# Patient Record
Sex: MALE | Race: WHITE | Employment: OTHER | URBAN - METROPOLITAN AREA
[De-identification: names, ages, dates, MRNs, and addresses within clinical notes are randomized per-mention and may not be internally consistent; named-entity substitution may affect disease eponyms.]

---

## 2018-06-18 ENCOUNTER — HOSPITAL ENCOUNTER (OUTPATIENT)
Dept: ULTRASOUND IMAGING | Age: 73
Discharge: HOME | End: 2018-06-20
Payer: MEDICARE

## 2018-06-18 DIAGNOSIS — N18.4 CHRONIC KIDNEY DISEASE, STAGE IV (SEVERE) (HCC): ICD-10-CM

## 2018-06-18 DIAGNOSIS — N13.30 HYDRONEPHROSIS, UNSPECIFIED HYDRONEPHROSIS TYPE: ICD-10-CM

## 2018-06-18 PROCEDURE — 76775 US EXAM ABDO BACK WALL LIM: CPT

## 2018-07-02 ENCOUNTER — HOSPITAL ENCOUNTER (OUTPATIENT)
Dept: CARDIAC CATH/INVASIVE PROCEDURES | Age: 73
Discharge: HOME | End: 2018-07-03
Attending: INTERNAL MEDICINE | Admitting: INTERNAL MEDICINE
Payer: MEDICARE

## 2018-07-02 DIAGNOSIS — N18.30 CKD (CHRONIC KIDNEY DISEASE), STAGE III (HCC): Primary | ICD-10-CM

## 2018-07-02 LAB
ABO/RH: NORMAL
ANION GAP SERPL CALCULATED.3IONS-SCNC: 14 MEQ/L (ref 7–13)
ANTIBODY SCREEN: NORMAL
BUN BLDV-MCNC: 50 MG/DL (ref 8–23)
CALCIUM SERPL-MCNC: 8.7 MG/DL (ref 8.6–10.2)
CHLORIDE BLD-SCNC: 107 MEQ/L (ref 98–107)
CO2: 21 MEQ/L (ref 22–29)
CREAT SERPL-MCNC: 2.53 MG/DL (ref 0.7–1.2)
GFR AFRICAN AMERICAN: 30.3
GFR NON-AFRICAN AMERICAN: 25
GLUCOSE BLD-MCNC: 133 MG/DL (ref 74–109)
GLUCOSE BLD-MCNC: 172 MG/DL (ref 60–115)
GLUCOSE BLD-MCNC: 313 MG/DL (ref 60–115)
HCT VFR BLD CALC: 41.2 % (ref 42–52)
HEMOGLOBIN: 13.7 G/DL (ref 14–18)
MCH RBC QN AUTO: 29.2 PG (ref 27–31.3)
MCHC RBC AUTO-ENTMCNC: 33.2 % (ref 33–37)
MCV RBC AUTO: 88.1 FL (ref 80–100)
PDW BLD-RTO: 13.6 % (ref 11.5–14.5)
PERFORMED ON: ABNORMAL
PLATELET # BLD: 218 K/UL (ref 130–400)
POC ACTIVATED CLOTTING TIME KAOLIN: 153 SEC (ref 82–152)
POC SAMPLE TYPE: ABNORMAL
POTASSIUM REFLEX MAGNESIUM: 5.1 MEQ/L (ref 3.5–5.1)
RBC # BLD: 4.67 M/UL (ref 4.7–6.1)
SODIUM BLD-SCNC: 142 MEQ/L (ref 132–144)
WBC # BLD: 10.9 K/UL (ref 4.8–10.8)

## 2018-07-02 PROCEDURE — 86901 BLOOD TYPING SEROLOGIC RH(D): CPT

## 2018-07-02 PROCEDURE — 37221 HC ILIAC TERRITORY PLASTY STENT: CPT | Performed by: INTERNAL MEDICINE

## 2018-07-02 PROCEDURE — C1887 CATHETER, GUIDING: HCPCS

## 2018-07-02 PROCEDURE — C1894 INTRO/SHEATH, NON-LASER: HCPCS

## 2018-07-02 PROCEDURE — 85347 COAGULATION TIME ACTIVATED: CPT

## 2018-07-02 PROCEDURE — 6360000002 HC RX W HCPCS

## 2018-07-02 PROCEDURE — 86850 RBC ANTIBODY SCREEN: CPT

## 2018-07-02 PROCEDURE — 75625 CONTRAST EXAM ABDOMINL AORTA: CPT | Performed by: INTERNAL MEDICINE

## 2018-07-02 PROCEDURE — 2580000003 HC RX 258: Performed by: INTERNAL MEDICINE

## 2018-07-02 PROCEDURE — 6360000002 HC RX W HCPCS: Performed by: INTERNAL MEDICINE

## 2018-07-02 PROCEDURE — 2580000003 HC RX 258

## 2018-07-02 PROCEDURE — 2500000003 HC RX 250 WO HCPCS

## 2018-07-02 PROCEDURE — C1876 STENT, NON-COA/NON-COV W/DEL: HCPCS

## 2018-07-02 PROCEDURE — C1769 GUIDE WIRE: HCPCS

## 2018-07-02 PROCEDURE — 85027 COMPLETE CBC AUTOMATED: CPT

## 2018-07-02 PROCEDURE — C1725 CATH, TRANSLUMIN NON-LASER: HCPCS

## 2018-07-02 PROCEDURE — 86900 BLOOD TYPING SEROLOGIC ABO: CPT

## 2018-07-02 PROCEDURE — 80048 BASIC METABOLIC PNL TOTAL CA: CPT

## 2018-07-02 PROCEDURE — 6370000000 HC RX 637 (ALT 250 FOR IP): Performed by: INTERNAL MEDICINE

## 2018-07-02 PROCEDURE — 75716 ARTERY X-RAYS ARMS/LEGS: CPT | Performed by: INTERNAL MEDICINE

## 2018-07-02 RX ORDER — HYDROXYZINE HYDROCHLORIDE 25 MG/1
25 TABLET, FILM COATED ORAL 2 TIMES DAILY
Status: DISCONTINUED | OUTPATIENT
Start: 2018-07-02 | End: 2018-07-03 | Stop reason: HOSPADM

## 2018-07-02 RX ORDER — NICOTINE 21 MG/24HR
1 PATCH, TRANSDERMAL 24 HOURS TRANSDERMAL DAILY
Status: DISCONTINUED | OUTPATIENT
Start: 2018-07-02 | End: 2018-07-03 | Stop reason: HOSPADM

## 2018-07-02 RX ORDER — NITROGLYCERIN 0.4 MG/1
0.4 TABLET SUBLINGUAL EVERY 5 MIN PRN
Status: DISCONTINUED | OUTPATIENT
Start: 2018-07-02 | End: 2018-07-03 | Stop reason: HOSPADM

## 2018-07-02 RX ORDER — LEVOTHYROXINE SODIUM 0.2 MG/1
200 TABLET ORAL DAILY
Status: DISCONTINUED | OUTPATIENT
Start: 2018-07-02 | End: 2018-07-03 | Stop reason: HOSPADM

## 2018-07-02 RX ORDER — DEXTROSE MONOHYDRATE 25 G/50ML
12.5 INJECTION, SOLUTION INTRAVENOUS PRN
Status: DISCONTINUED | OUTPATIENT
Start: 2018-07-02 | End: 2018-07-03 | Stop reason: HOSPADM

## 2018-07-02 RX ORDER — SODIUM CHLORIDE 0.9 % (FLUSH) 0.9 %
10 SYRINGE (ML) INJECTION EVERY 12 HOURS SCHEDULED
Status: DISCONTINUED | OUTPATIENT
Start: 2018-07-02 | End: 2018-07-03 | Stop reason: HOSPADM

## 2018-07-02 RX ORDER — ACETYLCYSTEINE 200 MG/ML
600 SOLUTION ORAL; RESPIRATORY (INHALATION) 2 TIMES DAILY
Status: DISCONTINUED | OUTPATIENT
Start: 2018-07-02 | End: 2018-07-03 | Stop reason: HOSPADM

## 2018-07-02 RX ORDER — SIMVASTATIN 20 MG
20 TABLET ORAL NIGHTLY
COMMUNITY

## 2018-07-02 RX ORDER — ACETYLCYSTEINE 200 MG/ML
600 SOLUTION ORAL; RESPIRATORY (INHALATION) 2 TIMES DAILY
Status: ON HOLD | COMMUNITY
End: 2018-07-03 | Stop reason: HOSPADM

## 2018-07-02 RX ORDER — ASPIRIN 81 MG/1
81 TABLET ORAL DAILY
Status: DISCONTINUED | OUTPATIENT
Start: 2018-07-02 | End: 2018-07-02 | Stop reason: SDUPTHER

## 2018-07-02 RX ORDER — DEXTROSE MONOHYDRATE 50 MG/ML
100 INJECTION, SOLUTION INTRAVENOUS PRN
Status: DISCONTINUED | OUTPATIENT
Start: 2018-07-02 | End: 2018-07-03 | Stop reason: HOSPADM

## 2018-07-02 RX ORDER — HYDRALAZINE HYDROCHLORIDE 20 MG/ML
10 INJECTION INTRAMUSCULAR; INTRAVENOUS EVERY 10 MIN PRN
Status: DISCONTINUED | OUTPATIENT
Start: 2018-07-02 | End: 2018-07-03 | Stop reason: HOSPADM

## 2018-07-02 RX ORDER — INSULIN GLARGINE 100 [IU]/ML
24 INJECTION, SOLUTION SUBCUTANEOUS NIGHTLY
Status: DISCONTINUED | OUTPATIENT
Start: 2018-07-02 | End: 2018-07-03 | Stop reason: HOSPADM

## 2018-07-02 RX ORDER — SODIUM CHLORIDE 0.9 % (FLUSH) 0.9 %
10 SYRINGE (ML) INJECTION PRN
Status: DISCONTINUED | OUTPATIENT
Start: 2018-07-02 | End: 2018-07-03 | Stop reason: HOSPADM

## 2018-07-02 RX ORDER — HYDROXYZINE HYDROCHLORIDE 25 MG/1
25 TABLET, FILM COATED ORAL 2 TIMES DAILY
COMMUNITY

## 2018-07-02 RX ORDER — ACETAMINOPHEN 325 MG/1
650 TABLET ORAL EVERY 4 HOURS PRN
Status: DISCONTINUED | OUTPATIENT
Start: 2018-07-02 | End: 2018-07-03 | Stop reason: HOSPADM

## 2018-07-02 RX ORDER — HYDRALAZINE HYDROCHLORIDE 20 MG/ML
10 INJECTION INTRAMUSCULAR; INTRAVENOUS
Status: DISCONTINUED | OUTPATIENT
Start: 2018-07-02 | End: 2018-07-03 | Stop reason: HOSPADM

## 2018-07-02 RX ORDER — INSULIN GLARGINE 100 [IU]/ML
16 INJECTION, SOLUTION SUBCUTANEOUS NIGHTLY
COMMUNITY

## 2018-07-02 RX ORDER — SODIUM CHLORIDE 450 MG/100ML
INJECTION, SOLUTION INTRAVENOUS CONTINUOUS
Status: DISCONTINUED | OUTPATIENT
Start: 2018-07-02 | End: 2018-07-03 | Stop reason: HOSPADM

## 2018-07-02 RX ORDER — SODIUM CHLORIDE 450 MG/100ML
INJECTION, SOLUTION INTRAVENOUS
Status: DISCONTINUED
Start: 2018-07-02 | End: 2018-07-02

## 2018-07-02 RX ORDER — SIMVASTATIN 20 MG
20 TABLET ORAL NIGHTLY
Status: DISCONTINUED | OUTPATIENT
Start: 2018-07-02 | End: 2018-07-03 | Stop reason: HOSPADM

## 2018-07-02 RX ORDER — ONDANSETRON 2 MG/ML
4 INJECTION INTRAMUSCULAR; INTRAVENOUS EVERY 6 HOURS PRN
Status: DISCONTINUED | OUTPATIENT
Start: 2018-07-02 | End: 2018-07-03 | Stop reason: HOSPADM

## 2018-07-02 RX ORDER — LOSARTAN POTASSIUM 50 MG/1
50 TABLET ORAL DAILY
Status: ON HOLD | COMMUNITY
End: 2018-07-17

## 2018-07-02 RX ORDER — CLOPIDOGREL BISULFATE 75 MG/1
75 TABLET ORAL DAILY
Status: DISCONTINUED | OUTPATIENT
Start: 2018-07-03 | End: 2018-07-03 | Stop reason: HOSPADM

## 2018-07-02 RX ORDER — NITROGLYCERIN 0.4 MG/1
0.4 TABLET SUBLINGUAL EVERY 5 MIN PRN
COMMUNITY

## 2018-07-02 RX ORDER — LOSARTAN POTASSIUM 50 MG/1
50 TABLET ORAL DAILY
Status: DISCONTINUED | OUTPATIENT
Start: 2018-07-02 | End: 2018-07-03 | Stop reason: HOSPADM

## 2018-07-02 RX ORDER — NICOTINE POLACRILEX 4 MG
15 LOZENGE BUCCAL PRN
Status: DISCONTINUED | OUTPATIENT
Start: 2018-07-02 | End: 2018-07-03 | Stop reason: HOSPADM

## 2018-07-02 RX ORDER — GLIPIZIDE 10 MG/1
10 TABLET ORAL
COMMUNITY

## 2018-07-02 RX ORDER — GLIPIZIDE 10 MG/1
10 TABLET ORAL
Status: DISCONTINUED | OUTPATIENT
Start: 2018-07-02 | End: 2018-07-03 | Stop reason: HOSPADM

## 2018-07-02 RX ORDER — SODIUM CHLORIDE 450 MG/100ML
INJECTION, SOLUTION INTRAVENOUS CONTINUOUS
Status: DISCONTINUED | OUTPATIENT
Start: 2018-07-02 | End: 2018-07-02

## 2018-07-02 RX ORDER — ASPIRIN 81 MG/1
81 TABLET ORAL DAILY
COMMUNITY

## 2018-07-02 RX ORDER — M-VIT,TX,IRON,MINS/CALC/FOLIC 27MG-0.4MG
1 TABLET ORAL DAILY
Status: DISCONTINUED | OUTPATIENT
Start: 2018-07-02 | End: 2018-07-03 | Stop reason: HOSPADM

## 2018-07-02 RX ORDER — CLOPIDOGREL BISULFATE 75 MG/1
75 TABLET ORAL DAILY
COMMUNITY

## 2018-07-02 RX ORDER — LEVOTHYROXINE SODIUM 0.2 MG/1
200 TABLET ORAL DAILY
COMMUNITY

## 2018-07-02 RX ORDER — ASPIRIN 81 MG/1
81 TABLET, CHEWABLE ORAL DAILY
Status: DISCONTINUED | OUTPATIENT
Start: 2018-07-03 | End: 2018-07-03 | Stop reason: HOSPADM

## 2018-07-02 RX ORDER — M-VIT,TX,IRON,MINS/CALC/FOLIC 27MG-0.4MG
1 TABLET ORAL DAILY
COMMUNITY

## 2018-07-02 RX ORDER — OXYCODONE HYDROCHLORIDE AND ACETAMINOPHEN 5; 325 MG/1; MG/1
1 TABLET ORAL EVERY 4 HOURS PRN
Status: DISCONTINUED | OUTPATIENT
Start: 2018-07-02 | End: 2018-07-03 | Stop reason: HOSPADM

## 2018-07-02 RX ORDER — CLOPIDOGREL BISULFATE 75 MG/1
75 TABLET ORAL DAILY
Status: DISCONTINUED | OUTPATIENT
Start: 2018-07-02 | End: 2018-07-02 | Stop reason: SDUPTHER

## 2018-07-02 RX ADMIN — SIMVASTATIN 20 MG: 20 TABLET, FILM COATED ORAL at 20:47

## 2018-07-02 RX ADMIN — METOPROLOL TARTRATE 25 MG: 25 TABLET ORAL at 20:47

## 2018-07-02 RX ADMIN — OXYCODONE HYDROCHLORIDE AND ACETAMINOPHEN 1 TABLET: 5; 325 TABLET ORAL at 18:37

## 2018-07-02 RX ADMIN — SODIUM CHLORIDE: 4.5 INJECTION, SOLUTION INTRAVENOUS at 09:00

## 2018-07-02 RX ADMIN — GLIPIZIDE 10 MG: 10 TABLET ORAL at 18:37

## 2018-07-02 RX ADMIN — MULTIPLE VITAMINS W/ MINERALS TAB 1 TABLET: TAB at 18:37

## 2018-07-02 RX ADMIN — ACETYLCYSTEINE 600 MG: 200 INHALANT RESPIRATORY (INHALATION) at 20:47

## 2018-07-02 RX ADMIN — HYDROXYZINE HYDROCHLORIDE 25 MG: 25 TABLET ORAL at 20:47

## 2018-07-02 RX ADMIN — SODIUM CHLORIDE: 4.5 INJECTION, SOLUTION INTRAVENOUS at 18:37

## 2018-07-02 RX ADMIN — INSULIN GLARGINE 24 UNITS: 100 INJECTION, SOLUTION SUBCUTANEOUS at 21:46

## 2018-07-03 VITALS
TEMPERATURE: 97.7 F | HEIGHT: 70 IN | HEART RATE: 66 BPM | RESPIRATION RATE: 16 BRPM | WEIGHT: 205.91 LBS | OXYGEN SATURATION: 100 % | SYSTOLIC BLOOD PRESSURE: 177 MMHG | BODY MASS INDEX: 29.48 KG/M2 | DIASTOLIC BLOOD PRESSURE: 58 MMHG

## 2018-07-03 PROBLEM — I73.9 CLAUDICATION OF BOTH LOWER EXTREMITIES (HCC): Status: ACTIVE | Noted: 2018-07-03

## 2018-07-03 PROBLEM — E11.9 TYPE 2 DIABETES MELLITUS (HCC): Status: ACTIVE | Noted: 2018-07-03

## 2018-07-03 PROBLEM — N18.30 CKD (CHRONIC KIDNEY DISEASE), STAGE III (HCC): Status: ACTIVE | Noted: 2018-07-03

## 2018-07-03 PROBLEM — I73.9 PERIPHERAL VASCULAR DISEASE (HCC): Status: ACTIVE | Noted: 2018-07-03

## 2018-07-03 PROBLEM — I10 ESSENTIAL HYPERTENSION: Status: ACTIVE | Noted: 2018-07-03

## 2018-07-03 PROBLEM — Z98.890 S/P CARDIAC CATH: Status: ACTIVE | Noted: 2018-07-03

## 2018-07-03 LAB
ANION GAP SERPL CALCULATED.3IONS-SCNC: 15 MEQ/L (ref 7–13)
BUN BLDV-MCNC: 47 MG/DL (ref 8–23)
CALCIUM SERPL-MCNC: 8.7 MG/DL (ref 8.6–10.2)
CHLORIDE BLD-SCNC: 105 MEQ/L (ref 98–107)
CO2: 18 MEQ/L (ref 22–29)
CREAT SERPL-MCNC: 2.48 MG/DL (ref 0.7–1.2)
GFR AFRICAN AMERICAN: 31
GFR NON-AFRICAN AMERICAN: 25.6
GLUCOSE BLD-MCNC: 207 MG/DL (ref 60–115)
GLUCOSE BLD-MCNC: 230 MG/DL (ref 74–109)
GLUCOSE BLD-MCNC: 233 MG/DL (ref 60–115)
HCT VFR BLD CALC: 39.3 % (ref 42–52)
HEMOGLOBIN: 13.4 G/DL (ref 14–18)
MAGNESIUM: 1.9 MG/DL (ref 1.7–2.3)
MCH RBC QN AUTO: 29.8 PG (ref 27–31.3)
MCHC RBC AUTO-ENTMCNC: 34.1 % (ref 33–37)
MCV RBC AUTO: 87.3 FL (ref 80–100)
PDW BLD-RTO: 13.9 % (ref 11.5–14.5)
PERFORMED ON: ABNORMAL
PERFORMED ON: ABNORMAL
PLATELET # BLD: 199 K/UL (ref 130–400)
POTASSIUM REFLEX MAGNESIUM: 4.6 MEQ/L (ref 3.5–5.1)
POTASSIUM SERPL-SCNC: 4.6 MEQ/L (ref 3.5–5.1)
RBC # BLD: 4.5 M/UL (ref 4.7–6.1)
SODIUM BLD-SCNC: 138 MEQ/L (ref 132–144)
WBC # BLD: 9 K/UL (ref 4.8–10.8)

## 2018-07-03 PROCEDURE — 36415 COLL VENOUS BLD VENIPUNCTURE: CPT

## 2018-07-03 PROCEDURE — 83735 ASSAY OF MAGNESIUM: CPT

## 2018-07-03 PROCEDURE — 6370000000 HC RX 637 (ALT 250 FOR IP): Performed by: INTERNAL MEDICINE

## 2018-07-03 PROCEDURE — 6360000002 HC RX W HCPCS: Performed by: INTERNAL MEDICINE

## 2018-07-03 PROCEDURE — 85027 COMPLETE CBC AUTOMATED: CPT

## 2018-07-03 PROCEDURE — 2580000003 HC RX 258: Performed by: INTERNAL MEDICINE

## 2018-07-03 PROCEDURE — 80048 BASIC METABOLIC PNL TOTAL CA: CPT

## 2018-07-03 RX ORDER — ACETYLCYSTEINE 200 MG/ML
600 SOLUTION ORAL; RESPIRATORY (INHALATION) 2 TIMES DAILY
Qty: 24 ML | Refills: 0 | Status: ON HOLD | OUTPATIENT
Start: 2018-07-03 | End: 2019-04-24 | Stop reason: ALTCHOICE

## 2018-07-03 RX ORDER — NICOTINE 21 MG/24HR
1 PATCH, TRANSDERMAL 24 HOURS TRANSDERMAL DAILY
Qty: 30 PATCH | Refills: 3 | Status: ON HOLD | OUTPATIENT
Start: 2018-07-03 | End: 2019-04-24 | Stop reason: ALTCHOICE

## 2018-07-03 RX ADMIN — ASPIRIN 81 MG 81 MG: 81 TABLET ORAL at 08:49

## 2018-07-03 RX ADMIN — LEVOTHYROXINE SODIUM 200 MCG: 200 TABLET ORAL at 06:06

## 2018-07-03 RX ADMIN — METOPROLOL TARTRATE 25 MG: 25 TABLET ORAL at 08:49

## 2018-07-03 RX ADMIN — GLIPIZIDE 10 MG: 10 TABLET ORAL at 08:49

## 2018-07-03 RX ADMIN — SODIUM CHLORIDE: 4.5 INJECTION, SOLUTION INTRAVENOUS at 06:06

## 2018-07-03 RX ADMIN — MULTIPLE VITAMINS W/ MINERALS TAB 1 TABLET: TAB at 08:49

## 2018-07-03 RX ADMIN — ACETYLCYSTEINE 600 MG: 200 INHALANT RESPIRATORY (INHALATION) at 08:49

## 2018-07-03 RX ADMIN — HYDROXYZINE HYDROCHLORIDE 25 MG: 25 TABLET ORAL at 08:49

## 2018-07-03 RX ADMIN — CLOPIDOGREL BISULFATE 75 MG: 75 TABLET ORAL at 08:49

## 2018-07-03 RX ADMIN — LOSARTAN POTASSIUM 50 MG: 50 TABLET ORAL at 08:49

## 2018-07-03 ASSESSMENT — PAIN SCALES - GENERAL
PAINLEVEL_OUTOF10: 0
PAINLEVEL_OUTOF10: 0

## 2018-07-03 NOTE — FLOWSHEET NOTE
patient remain in bed. he denies pain,no shortness of breath,his lung sounds clear,apical pulse regular at 84 beats/ minute,radial pulses palpable,abdomen soft with active bowel sounds on all abdominal quadrants. the right groin dressing is dry,the site is soft. no hematoma. doralis pulses present and confirmed with the doppler. both of his feet are cool to touch as compared to his trunk.

## 2018-07-03 NOTE — PROCEDURES
Debby [address]                       [address] [place], [address] [facility]                              CARDIAC CATHETERIZATION    PATIENT NAME: Mert Chacon                    :        1945  MED REC NO:   [id number]                            ROOM:       [id number]  ACCOUNT NO:   [de-identified]                           ADMIT DATE: 2018  PROVIDER:     Yuliana Dukes MD    DATE OF PROCEDURE:  2018    BRIEF PREPROCEDURE NOTE:  The patient presented on an outpatient basis for  severe claudication. He has bilateral buttock pain as well as bilateral  calf pain. The patient has a known peripheral angioplasty and stent placed  by Dr. Marisa Rodriguez in the remote past.  The patient on exam had an  abdominal bruit. He had bilateral weak femoral pulses, but thankfully, the  patient had no distal lesions in his lower extremities. He has also severe  renal insufficiency and a consultation was made with Dr. Roxanna Rose, his  primary nephrologist.  We considered doing a CT angio but probably less  contrast would be used if we did a direct angiography. The patient and the  patient's wife were advised of the risks including rare need for urgent  surgery, bleeding, arterial damage, distal embolization, and more  importantly, renal insufficiency. They are well aware of his potential to  have an at least temporary and possible permanent dialysis; however, the  patient was pretreated with stopping his diuretic, encouraging fluids,  starting him on Mucomyst, was brought in early for hydration. He agreed to  procedure. PROCEDURES:  1. Abdominal aortic angiogram.  2.  Cannulation of the left common iliac and left common iliac angiography. 3.  Selective left superficial femoral artery angiography at the level of  the left femoral artery. 4.  Ipsilateral right iliac angiography.   5.  Through the existing sheath, right external iliac angiography with  follow-through of the right superficial femoral artery. INTERVENTION:  1. Direct stenting of the right external iliac. 2.  Post-stent angioplasty. DESCRIPTION OF PROCEDURE:  The patient was taken to the cardiac  catheterization lab in [facility]. The patient  was prepped and draped in sterile fashion. Right groin was anesthetized  with 1% lidocaine, and by Seldinger technique,  eventually, with the aid of  a Wholey wire, a 4-Malian short sheath was placed. Through the sheath and  over the [facility] wire, a pigtail was placed in the abdominal aorta. Aortogram showed no significant right or left renal artery stenosis and  although there was a history of an abdominal lesion, none could be  appreciated on the angiography today. A LIMA catheter was then used to  cannulate the left common iliac and guiding shots were performed. We were  not able to opacify the whole left iliac system or the left superficial  femoral artery, and with an Advantage wire, the LIMA catheter was threaded  down a little bit more distally. There was significant resistance to the  LIMA catheter, so a 4-Malian Glide catheter was tried with the same result. In any event, we were able to extend the LIMA catheter into the distal  portion of the left external iliac. Angiography of the left superficial  femoral artery was then performed. The catheter was then withdrawn to the  more proximal portion, i.e., in the previously placed left common iliac  stent and angiography was also performed. The catheter was then brought  back and angiography of the right common iliac and right external iliacs  was performed. Through the existing sheath, angiography of the right  superficial femoral artery was performed. It was then decided to intervene  as will be described below. The 4-Malian sheath was then exchanged for a  6-Malian sheath and direct stenting of the right external iliac was made  and subsequent balloon angioplasty with a 7-mm balloon.

## 2018-07-16 ENCOUNTER — HOSPITAL ENCOUNTER (OUTPATIENT)
Dept: CARDIAC CATH/INVASIVE PROCEDURES | Age: 73
Discharge: HOME | End: 2018-07-17
Attending: INTERNAL MEDICINE | Admitting: INTERNAL MEDICINE
Payer: MEDICARE

## 2018-07-16 LAB
ABO/RH: NORMAL
ANION GAP SERPL CALCULATED.3IONS-SCNC: 11 MEQ/L (ref 7–13)
ANION GAP SERPL CALCULATED.3IONS-SCNC: 12 MEQ/L (ref 7–13)
ANTIBODY SCREEN: NORMAL
BUN BLDV-MCNC: 51 MG/DL (ref 8–23)
BUN BLDV-MCNC: 53 MG/DL (ref 8–23)
CALCIUM SERPL-MCNC: 8.8 MG/DL (ref 8.6–10.2)
CALCIUM SERPL-MCNC: 9.1 MG/DL (ref 8.6–10.2)
CHLORIDE BLD-SCNC: 109 MEQ/L (ref 98–107)
CHLORIDE BLD-SCNC: 112 MEQ/L (ref 98–107)
CO2: 21 MEQ/L (ref 22–29)
CO2: 21 MEQ/L (ref 22–29)
CREAT SERPL-MCNC: 2.57 MG/DL (ref 0.7–1.2)
CREAT SERPL-MCNC: 2.59 MG/DL (ref 0.7–1.2)
GFR AFRICAN AMERICAN: 29.5
GFR AFRICAN AMERICAN: 29.8
GFR NON-AFRICAN AMERICAN: 24.4
GFR NON-AFRICAN AMERICAN: 24.6
GLUCOSE BLD-MCNC: 163 MG/DL (ref 74–109)
GLUCOSE BLD-MCNC: 205 MG/DL (ref 74–109)
GLUCOSE BLD-MCNC: 219 MG/DL (ref 60–115)
GLUCOSE BLD-MCNC: 235 MG/DL (ref 60–115)
HCT VFR BLD CALC: 41.5 % (ref 42–52)
HEMOGLOBIN: 13.7 G/DL (ref 14–18)
MCH RBC QN AUTO: 29.1 PG (ref 27–31.3)
MCHC RBC AUTO-ENTMCNC: 33.1 % (ref 33–37)
MCV RBC AUTO: 87.9 FL (ref 80–100)
PDW BLD-RTO: 13.8 % (ref 11.5–14.5)
PERFORMED ON: ABNORMAL
PLATELET # BLD: 240 K/UL (ref 130–400)
POC ACTIVATED CLOTTING TIME KAOLIN: 164 SEC (ref 82–152)
POC SAMPLE TYPE: ABNORMAL
POTASSIUM REFLEX MAGNESIUM: 5.4 MEQ/L (ref 3.5–5.1)
POTASSIUM SERPL-SCNC: 5.5 MEQ/L (ref 3.5–5.1)
RBC # BLD: 4.72 M/UL (ref 4.7–6.1)
SODIUM BLD-SCNC: 141 MEQ/L (ref 132–144)
SODIUM BLD-SCNC: 145 MEQ/L (ref 132–144)
WBC # BLD: 11.4 K/UL (ref 4.8–10.8)

## 2018-07-16 PROCEDURE — 37221 HC ILIAC TERRITORY PLASTY STENT: CPT | Performed by: INTERNAL MEDICINE

## 2018-07-16 PROCEDURE — 80048 BASIC METABOLIC PNL TOTAL CA: CPT

## 2018-07-16 PROCEDURE — C1725 CATH, TRANSLUMIN NON-LASER: HCPCS

## 2018-07-16 PROCEDURE — C1894 INTRO/SHEATH, NON-LASER: HCPCS

## 2018-07-16 PROCEDURE — 6360000002 HC RX W HCPCS

## 2018-07-16 PROCEDURE — 75710 ARTERY X-RAYS ARM/LEG: CPT | Performed by: INTERNAL MEDICINE

## 2018-07-16 PROCEDURE — C1876 STENT, NON-COA/NON-COV W/DEL: HCPCS

## 2018-07-16 PROCEDURE — 6370000000 HC RX 637 (ALT 250 FOR IP): Performed by: INTERNAL MEDICINE

## 2018-07-16 PROCEDURE — 2720000001 HC MISC SURG SUPPLY STERILE $51-500

## 2018-07-16 PROCEDURE — C1769 GUIDE WIRE: HCPCS

## 2018-07-16 PROCEDURE — 6360000002 HC RX W HCPCS: Performed by: INTERNAL MEDICINE

## 2018-07-16 PROCEDURE — 85027 COMPLETE CBC AUTOMATED: CPT

## 2018-07-16 PROCEDURE — 86900 BLOOD TYPING SEROLOGIC ABO: CPT

## 2018-07-16 PROCEDURE — 2580000003 HC RX 258

## 2018-07-16 PROCEDURE — 85347 COAGULATION TIME ACTIVATED: CPT

## 2018-07-16 PROCEDURE — 86850 RBC ANTIBODY SCREEN: CPT

## 2018-07-16 PROCEDURE — 86901 BLOOD TYPING SEROLOGIC RH(D): CPT

## 2018-07-16 PROCEDURE — 2580000003 HC RX 258: Performed by: INTERNAL MEDICINE

## 2018-07-16 PROCEDURE — 2500000003 HC RX 250 WO HCPCS

## 2018-07-16 PROCEDURE — 36415 COLL VENOUS BLD VENIPUNCTURE: CPT

## 2018-07-16 RX ORDER — HYDROXYZINE HYDROCHLORIDE 25 MG/1
25 TABLET, FILM COATED ORAL 2 TIMES DAILY
Status: DISCONTINUED | OUTPATIENT
Start: 2018-07-16 | End: 2018-07-17 | Stop reason: HOSPADM

## 2018-07-16 RX ORDER — M-VIT,TX,IRON,MINS/CALC/FOLIC 27MG-0.4MG
1 TABLET ORAL DAILY
Status: DISCONTINUED | OUTPATIENT
Start: 2018-07-16 | End: 2018-07-17 | Stop reason: HOSPADM

## 2018-07-16 RX ORDER — SODIUM CHLORIDE 0.9 % (FLUSH) 0.9 %
10 SYRINGE (ML) INJECTION EVERY 12 HOURS SCHEDULED
Status: DISCONTINUED | OUTPATIENT
Start: 2018-07-16 | End: 2018-07-17 | Stop reason: HOSPADM

## 2018-07-16 RX ORDER — SODIUM POLYSTYRENE SULFONATE 15 G/60ML
30 SUSPENSION ORAL; RECTAL ONCE
Status: COMPLETED | OUTPATIENT
Start: 2018-07-16 | End: 2018-07-16

## 2018-07-16 RX ORDER — HYDRALAZINE HYDROCHLORIDE 20 MG/ML
10 INJECTION INTRAMUSCULAR; INTRAVENOUS EVERY 10 MIN PRN
Status: DISCONTINUED | OUTPATIENT
Start: 2018-07-16 | End: 2018-07-17 | Stop reason: HOSPADM

## 2018-07-16 RX ORDER — INSULIN GLARGINE 100 [IU]/ML
24 INJECTION, SOLUTION SUBCUTANEOUS NIGHTLY
Status: DISCONTINUED | OUTPATIENT
Start: 2018-07-16 | End: 2018-07-17 | Stop reason: HOSPADM

## 2018-07-16 RX ORDER — LEVOTHYROXINE SODIUM 0.2 MG/1
200 TABLET ORAL DAILY
Status: DISCONTINUED | OUTPATIENT
Start: 2018-07-16 | End: 2018-07-17 | Stop reason: HOSPADM

## 2018-07-16 RX ORDER — ACETAMINOPHEN 325 MG/1
650 TABLET ORAL EVERY 4 HOURS PRN
Status: DISCONTINUED | OUTPATIENT
Start: 2018-07-16 | End: 2018-07-17 | Stop reason: HOSPADM

## 2018-07-16 RX ORDER — SODIUM CHLORIDE 9 MG/ML
INJECTION, SOLUTION INTRAVENOUS CONTINUOUS
Status: DISCONTINUED | OUTPATIENT
Start: 2018-07-16 | End: 2018-07-17 | Stop reason: HOSPADM

## 2018-07-16 RX ORDER — NICOTINE 21 MG/24HR
1 PATCH, TRANSDERMAL 24 HOURS TRANSDERMAL DAILY
Status: DISCONTINUED | OUTPATIENT
Start: 2018-07-16 | End: 2018-07-17 | Stop reason: HOSPADM

## 2018-07-16 RX ORDER — SODIUM CHLORIDE 0.9 % (FLUSH) 0.9 %
10 SYRINGE (ML) INJECTION PRN
Status: DISCONTINUED | OUTPATIENT
Start: 2018-07-16 | End: 2018-07-17 | Stop reason: HOSPADM

## 2018-07-16 RX ORDER — ASPIRIN 81 MG/1
81 TABLET ORAL DAILY
Status: DISCONTINUED | OUTPATIENT
Start: 2018-07-16 | End: 2018-07-17 | Stop reason: HOSPADM

## 2018-07-16 RX ORDER — NITROGLYCERIN 0.4 MG/1
0.4 TABLET SUBLINGUAL EVERY 5 MIN PRN
Status: DISCONTINUED | OUTPATIENT
Start: 2018-07-16 | End: 2018-07-17 | Stop reason: HOSPADM

## 2018-07-16 RX ORDER — DEXTROSE MONOHYDRATE 25 G/50ML
12.5 INJECTION, SOLUTION INTRAVENOUS PRN
Status: DISCONTINUED | OUTPATIENT
Start: 2018-07-16 | End: 2018-07-17 | Stop reason: HOSPADM

## 2018-07-16 RX ORDER — ONDANSETRON 2 MG/ML
4 INJECTION INTRAMUSCULAR; INTRAVENOUS EVERY 6 HOURS PRN
Status: DISCONTINUED | OUTPATIENT
Start: 2018-07-16 | End: 2018-07-17 | Stop reason: HOSPADM

## 2018-07-16 RX ORDER — SIMVASTATIN 20 MG
20 TABLET ORAL NIGHTLY
Status: DISCONTINUED | OUTPATIENT
Start: 2018-07-16 | End: 2018-07-17 | Stop reason: HOSPADM

## 2018-07-16 RX ORDER — ACETYLCYSTEINE 200 MG/ML
600 SOLUTION ORAL; RESPIRATORY (INHALATION) 2 TIMES DAILY
Status: DISCONTINUED | OUTPATIENT
Start: 2018-07-16 | End: 2018-07-17 | Stop reason: HOSPADM

## 2018-07-16 RX ORDER — CLOPIDOGREL BISULFATE 75 MG/1
75 TABLET ORAL DAILY
Status: DISCONTINUED | OUTPATIENT
Start: 2018-07-16 | End: 2018-07-17 | Stop reason: HOSPADM

## 2018-07-16 RX ORDER — NICOTINE POLACRILEX 4 MG
15 LOZENGE BUCCAL PRN
Status: DISCONTINUED | OUTPATIENT
Start: 2018-07-16 | End: 2018-07-17 | Stop reason: HOSPADM

## 2018-07-16 RX ORDER — DEXTROSE MONOHYDRATE 50 MG/ML
100 INJECTION, SOLUTION INTRAVENOUS PRN
Status: DISCONTINUED | OUTPATIENT
Start: 2018-07-16 | End: 2018-07-17 | Stop reason: HOSPADM

## 2018-07-16 RX ORDER — GLIPIZIDE 10 MG/1
10 TABLET ORAL
Status: DISCONTINUED | OUTPATIENT
Start: 2018-07-16 | End: 2018-07-17 | Stop reason: HOSPADM

## 2018-07-16 RX ADMIN — SODIUM CHLORIDE: 9 INJECTION, SOLUTION INTRAVENOUS at 17:08

## 2018-07-16 RX ADMIN — GLIPIZIDE 10 MG: 10 TABLET ORAL at 17:08

## 2018-07-16 RX ADMIN — HYDRALAZINE HYDROCHLORIDE 10 MG: 20 INJECTION INTRAMUSCULAR; INTRAVENOUS at 14:35

## 2018-07-16 RX ADMIN — SIMVASTATIN 20 MG: 20 TABLET, FILM COATED ORAL at 22:03

## 2018-07-16 RX ADMIN — HYDROXYZINE HYDROCHLORIDE 25 MG: 25 TABLET ORAL at 22:03

## 2018-07-16 RX ADMIN — SODIUM CHLORIDE: 9 INJECTION, SOLUTION INTRAVENOUS at 08:19

## 2018-07-16 RX ADMIN — ACETYLCYSTEINE 600 MG: 200 INHALANT RESPIRATORY (INHALATION) at 22:03

## 2018-07-16 RX ADMIN — METOPROLOL TARTRATE 25 MG: 25 TABLET ORAL at 22:16

## 2018-07-16 RX ADMIN — DEXTROSE MONOHYDRATE 1 G: 5 INJECTION INTRAVENOUS at 22:16

## 2018-07-16 RX ADMIN — SODIUM POLYSTYRENE SULFONATE 30 G: 15 SUSPENSION ORAL; RECTAL at 22:16

## 2018-07-16 ASSESSMENT — PAIN SCALES - GENERAL: PAINLEVEL_OUTOF10: 0

## 2018-07-16 NOTE — PROCEDURES
Joseph Rodriguez is a 67 y.o. male patient. No diagnosis found. Past Medical History:   Diagnosis Date    Diabetes mellitus ([facility] [address]     Hyperlipidemia     Hypertension      Height 5' 10\" (1.778 m), weight 205 lb (93 kg).     Procedures see dictated procedure note--7/16/2018  Direct stenting and post stent PTA of the left common iliac reducing a 70-80% stenosis to less 10%   Plan: will consider d/c later today though the renal Insufficiency needs to be considered  In the very least will need a chem six in the am    Erum Watson MD  7/16/2018

## 2018-07-16 NOTE — PROGRESS NOTES
Patient is resting in bed and left groin remains soft and dry. Patient voided and patient has eaten and drank.   Wife went home for a little bit

## 2018-07-16 NOTE — PROCEDURES
[address] Elder [address]                       [address], [address] [facility]                              CARDIAC CATHETERIZATION    PATIENT NAME: Vladimir Grant                    :        1945  MED REC NO:   [id number]                            ROOM:  ACCOUNT NO:   [de-identified]                           ADMIT DATE: 2018  PROVIDER:     Romario Moffett MD    DATE OF PROCEDURE:  2018    PROCEDURE PERFORMED:  1. Left iliac angiography. 2.  Direct stenting of left common iliac artery. 3.  Subsequent balloon angioplasty after direct stenting. 4.  Postprocedure angiography. INDICATIONS:  Mr. Campos Cordero was seen just two weeks ago and had peripheral  angiography as well as direct stenting of the right external iliac artery. The patient is back today to address his left system. The patient had left  buttock claudication as well as right and left calf claudication. Thankfully, the patient's claudication symptoms on the right have much  improved after the direct right external iliac stenting. He is here to  address his left, which he has a severe stenosis. The patient had in  had an angioplasty and stent placed in the left  common iliac by Dr. James Matos. He had a 10 mm stent, which is ballooned  up to 8 mm at that time. The patient has a significant 70-80% stenosis of  the left common iliac, directly after his previously placed stent. He has  moderate disease of the left external iliac as well as significant disease  in the left superficial femoral artery. This is all complicated by his creatinine hovers in the 2.4-2.6 range. Today's creatinine was noted to be 2.59. The patient was brought in and is appropriately hydrated. The patient's  other medications included insulin, Mucomyst, losartan, and metoprolol. ASSESSMENT: The patient was brought in today for left common iliac stenting  and angioplasty.   Hopefully, we can reduce the

## 2018-07-17 VITALS
TEMPERATURE: 97.3 F | RESPIRATION RATE: 16 BRPM | HEART RATE: 60 BPM | SYSTOLIC BLOOD PRESSURE: 172 MMHG | DIASTOLIC BLOOD PRESSURE: 57 MMHG | HEIGHT: 70 IN | BODY MASS INDEX: 29.35 KG/M2 | OXYGEN SATURATION: 100 % | WEIGHT: 205 LBS

## 2018-07-17 PROBLEM — Z98.62 S/P PERIPHERAL ARTERY ANGIOPLASTY: Status: ACTIVE | Noted: 2018-07-17

## 2018-07-17 LAB
ANION GAP SERPL CALCULATED.3IONS-SCNC: 11 MEQ/L (ref 7–13)
BUN BLDV-MCNC: 47 MG/DL (ref 8–23)
CALCIUM SERPL-MCNC: 8.3 MG/DL (ref 8.6–10.2)
CHLORIDE BLD-SCNC: 112 MEQ/L (ref 98–107)
CO2: 21 MEQ/L (ref 22–29)
CREAT SERPL-MCNC: 2.24 MG/DL (ref 0.7–1.2)
GFR AFRICAN AMERICAN: 34.9
GFR NON-AFRICAN AMERICAN: 28.8
GLUCOSE BLD-MCNC: 171 MG/DL (ref 60–115)
GLUCOSE BLD-MCNC: 171 MG/DL (ref 60–115)
GLUCOSE BLD-MCNC: 175 MG/DL (ref 74–109)
HCT VFR BLD CALC: 39.1 % (ref 42–52)
HEMOGLOBIN: 13.2 G/DL (ref 14–18)
MCH RBC QN AUTO: 29.5 PG (ref 27–31.3)
MCHC RBC AUTO-ENTMCNC: 33.7 % (ref 33–37)
MCV RBC AUTO: 87.5 FL (ref 80–100)
PDW BLD-RTO: 14 % (ref 11.5–14.5)
PERFORMED ON: ABNORMAL
PERFORMED ON: ABNORMAL
PLATELET # BLD: 220 K/UL (ref 130–400)
POTASSIUM REFLEX MAGNESIUM: 4.6 MEQ/L (ref 3.5–5.1)
POTASSIUM SERPL-SCNC: 4.6 MEQ/L (ref 3.5–5.1)
RBC # BLD: 4.47 M/UL (ref 4.7–6.1)
SODIUM BLD-SCNC: 144 MEQ/L (ref 132–144)
WBC # BLD: 10.3 K/UL (ref 4.8–10.8)

## 2018-07-17 PROCEDURE — 85027 COMPLETE CBC AUTOMATED: CPT

## 2018-07-17 PROCEDURE — 2580000003 HC RX 258: Performed by: INTERNAL MEDICINE

## 2018-07-17 PROCEDURE — 6370000000 HC RX 637 (ALT 250 FOR IP): Performed by: INTERNAL MEDICINE

## 2018-07-17 PROCEDURE — 6360000002 HC RX W HCPCS: Performed by: INTERNAL MEDICINE

## 2018-07-17 PROCEDURE — 80048 BASIC METABOLIC PNL TOTAL CA: CPT

## 2018-07-17 PROCEDURE — 36415 COLL VENOUS BLD VENIPUNCTURE: CPT

## 2018-07-17 RX ORDER — LOSARTAN POTASSIUM 50 MG/1
25 TABLET ORAL DAILY
Qty: 30 TABLET | Refills: 3
Start: 2018-07-17

## 2018-07-17 RX ADMIN — GLIPIZIDE 10 MG: 10 TABLET ORAL at 07:59

## 2018-07-17 RX ADMIN — DEXTROSE MONOHYDRATE 1 G: 5 INJECTION INTRAVENOUS at 07:42

## 2018-07-17 RX ADMIN — ACETYLCYSTEINE 600 MG: 200 INHALANT RESPIRATORY (INHALATION) at 07:59

## 2018-07-17 RX ADMIN — HYDROXYZINE HYDROCHLORIDE 25 MG: 25 TABLET ORAL at 07:59

## 2018-07-17 RX ADMIN — CLOPIDOGREL BISULFATE 75 MG: 75 TABLET ORAL at 08:00

## 2018-07-17 RX ADMIN — SODIUM CHLORIDE: 9 INJECTION, SOLUTION INTRAVENOUS at 04:18

## 2018-07-17 RX ADMIN — METOPROLOL TARTRATE 25 MG: 25 TABLET ORAL at 07:59

## 2018-07-17 RX ADMIN — INSULIN GLARGINE 24 UNITS: 100 INJECTION, SOLUTION SUBCUTANEOUS at 01:00

## 2018-07-17 RX ADMIN — ASPIRIN 81 MG: 81 TABLET, COATED ORAL at 07:59

## 2018-07-17 RX ADMIN — LEVOTHYROXINE SODIUM 200 MCG: 200 TABLET ORAL at 07:42

## 2018-07-17 RX ADMIN — I-VITE, TAB 1000-60-2MG (60/BT) 1 TABLET: TAB at 08:01

## 2018-07-17 ASSESSMENT — PAIN SCALES - GENERAL
PAINLEVEL_OUTOF10: 0
PAINLEVEL_OUTOF10: 0

## 2018-07-17 NOTE — FLOWSHEET NOTE
Kacey christine'd. Te;andrey christine'd. Pt given discharge instructions questions answered. Wife called for ride home.  Left groin d&I

## 2018-07-17 NOTE — PROGRESS NOTES
CARDIOLOGY [facility] DISCHARGE NOTE         7/17/2018      Paul Cooper    [id number]  1945    Adrian MD: Callie Bo MD ,[facility]    Primary Cardiologist:  Rishabh Patel D.O.    SUBJECTIVE:     Patient has no complaints post angioplasty stenting of the left common femoral artery yesterday. No cardiovascular complaints. Nephrology consultation noted. Plan discharge today. Cardiac and general ROS otherwise negative and unchanged. ASSESSMENT:    Peripheral vascular disease   Post left common iliac artery stent angioplasty, 80% to less than 10%, 7/16/2018. Hypertension  Hyperlipidemia  Diabetes  Chronic renal insufficiency  Hyperkalemia  Anemia    PLAN:    Patient is doing well post angioplasty. Nephrology saw and suggested to continue current medications including Cozaar. Given the hyperkalemia will offer low potassium diet and decreased Cozaar 25 mg daily for now. Plan to discharge today. Prescription medications provided. Follow-up with Dr. Jenaro Quiroga is scheduled next week. Patient will follow-up with Dr. Juan J Zaldivar patient's primary cardiologist as scheduled. See orders.       OBJECTIVE:     MEDICATIONS:     Scheduled Meds:   sodium chloride flush  10 mL Intravenous 2 times per day    sodium chloride flush  10 mL Intravenous 2 times per day    acetylcysteine  600 mg Oral BID    aspirin  81 mg Oral Daily    clopidogrel  75 mg Oral Daily    glipiZIDE  10 mg Oral BID AC    hydrOXYzine  25 mg Oral BID    insulin glargine  24 Units Subcutaneous Nightly    levothyroxine  200 mcg Oral Daily    metoprolol tartrate  25 mg Oral BID    therapeutic multivitamin-minerals  1 tablet Oral Daily    nicotine  1 patch Transdermal Daily    simvastatin  20 mg Oral Nightly    insulin lispro  0-6 Units Subcutaneous 4x Daily AC & HS    insulin lispro  0-3 Units Subcutaneous Nightly    ceFAZolin  1 g Intravenous Q8H     Continuous Infusions:   sodium chloride 75 mL/hr at 07/16/18 0819    sodium chloride 125 mL/hr at 07/17/18 0418    dextrose       PRN Meds:sodium chloride flush, acetaminophen, magnesium hydroxide, ondansetron, hydrALAZINE, nitroGLYCERIN, glucose, dextrose, glucagon (rDNA), dextrose    PHYSICAL EXAM:    CURRENT VITALS: BP (!) 172/57   Pulse 60   Temp 97.3 °F (36.3 °C) (Oral)   Resp 16   Ht 5' 10\" (1.778 m)   Wt 205 lb (93 kg)   SpO2 100%   BMI 29.41 kg/m²     CONSTITUTIONAL:  awake, alert, cooperative, no apparent distress,   ENT:  Normocephalic, without obvious abnormality, atraumatic, sinuses nontender on palpation, external ears without lesions,  NECK:  Supple, symmetrical, trachea midline, no adenopathy, thyroid symmetric, not enlarged and no tenderness, skin normal  LUNGS:  No increased work of breathing, good air exchange, clear to auscultation bilaterally, no crackles or wheezing  CARDIOVASCULAR:  Normal apical impulse, regular rate and rhythm, normal S1 and S2,  and 2/6 murmur noted  ABDOMEN:  Obese, Normal bowel sounds, soft, non-distended, non-tender, no masses palpated, no hepatosplenomegally  EXTREMITIES:  No edema, Pulses strong throughout. Left groin okay. NEUROLOGIC:  Awake, alert, oriented to name, place and time.  Following all commands and moving all extremties  SKIN:  no bruising or bleeding, normal skin color, texture, turgor and no rashes     Data:        LABS:      Recent Results (from the past 24 hour(s))   POCT Glucose    Collection Time: 07/16/18  5:05 PM   Result Value Ref Range    POC Glucose 219 (H) 60 - 115 mg/dl    Performed on ACCU-CHEK    Basic Metabolic Panel    Collection Time: 07/16/18  5:45 PM   Result Value Ref Range    Sodium 145 (H) 132 - 144 mEq/L    Potassium 5.5 (H) 3.5 - 5.1 mEq/L    Chloride 112 (H) 98 - 107 mEq/L    CO2 21 (L) 22 - 29 mEq/L    Anion Gap 12 7 - 13 mEq/L    Glucose 205 (H) 74 - 109 mg/dL    BUN 51 (H) 8 - 23 mg/dL    CREATININE 2.57 (H) 0.70 - 1.20 mg/dL    GFR Non-African American 24.6 (L) >60    GFR  American 29.8 (L) >60    Calcium 8.8 8.6 - 10.2 mg/dL   POCT Glucose    Collection Time: 07/16/18  9:39 PM   Result Value Ref Range    POC Glucose 235 (H) 60 - 115 mg/dl    Performed on ACCU-CHEK    Basic Metabolic Panel w/ Reflex to MG    Collection Time: 07/17/18  5:59 AM   Result Value Ref Range    Sodium 144 132 - 144 mEq/L    Potassium reflex Magnesium 4.6 3.5 - 5.1 mEq/L    Chloride 112 (H) 98 - 107 mEq/L    CO2 21 (L) 22 - 29 mEq/L    Anion Gap 11 7 - 13 mEq/L    Glucose 175 (H) 74 - 109 mg/dL    BUN 47 (H) 8 - 23 mg/dL    CREATININE 2.24 (H) 0.70 - 1.20 mg/dL    GFR Non-African American 28.8 (L) >60    GFR  34.9 (L) >60    Calcium 8.3 (L) 8.6 - 10.2 mg/dL   CBC    Collection Time: 07/17/18  5:59 AM   Result Value Ref Range    WBC 10.3 4.8 - 10.8 K/uL    RBC 4.47 (L) 4.70 - 6.10 M/uL    Hemoglobin 13.2 (L) 14.0 - 18.0 g/dL    Hematocrit 39.1 (L) 42.0 - 52.0 %    MCV 87.5 80.0 - 100.0 fL    MCH 29.5 27.0 - 31.3 pg    MCHC 33.7 33.0 - 37.0 %    RDW 14.0 11.5 - 14.5 %    Platelets 852 866 - 401 K/uL   Basic Metabolic Panel    Collection Time: 07/17/18  5:59 AM   Result Value Ref Range    Potassium 4.6 3.5 - 5.1 mEq/L   POCT Glucose    Collection Time: 07/17/18  6:31 AM   Result Value Ref Range    POC Glucose 171 (H) 60 - 115 mg/dl    Performed on ACCU-CHEK        PTA LEFT COMMON ILIAC ARTERY: Per PDW 7/16/18  The patient underwent direct stenting and subsequent  angioplasty of the left common iliac, reducing the 70%-80% stenosis to   Less than 10% without evidence of extravasation of dye, with the exception that  the above-mentioned previous attempt did show some subcutaneous dye to be  getting into a dissection plane.  There was good flow noted all the way  through to the left common femoral artery.         Guera Martinez MD ,[facility]  [address] [facility]

## 2018-07-17 NOTE — CONSULTS
Results   Component Value Date     07/17/2018    K 4.6 07/17/2018    K 4.6 07/17/2018     07/17/2018    CO2 21 07/17/2018    BUN 47 07/17/2018    CREATININE 2.24 07/17/2018    CALCIUM 8.3 07/17/2018    GFRAA 34.9 07/17/2018    LABGLOM 28.8 07/17/2018    GLUCOSE 175 07/17/2018     Us Retroperitoneal Limited    Result Date: 6/18/2018  LIMITED RETROPERITONEAL ULTRASOUND/BILATERAL RENAL ULTRASOUND: REASON FOR EXAMINATION:  STAGE IV CHRONIC RENAL DISEASE, RENAL DYSFUNCTION, EVALUATE FOR POSSIBLE HYDRONEPHROSIS OR OBSTRUCTIVE UROPATHY COMPARISON:  NONE TECHNIQUE:  Multiple longitudinal and transverse ultrasound scans of the kidneys were obtained. FINDINGS:  The right kidney measures 11.0 x 6.0 x 5.8 cm and the left kidney measures 11.5 x 6.4 x 4.3 cm. There is no evidence of nephrolithiasis or hydronephrosis. There are several small cysts in the right kidney and small cysts in the left kidney including a 1.9 cm septated cyst in the lower pole of the left kidney. There is no solid renal mass visualized. Otherwise unremarkable bilateral renal ultrasound. 1. NORMAL SIZE KIDNEYS WITHOUT NEPHROLITHIASIS OR HYDRONEPHROSIS. 2. BILATERAL RENAL CYSTS INCLUDING A 1.9 CM SEPTATED CYST IN LOWER POLE OF LEFT KIDNEY. Assessment/  66 yo man with CKD stage 4. Followed by Dr. Shante Rizzo. Risk factors of DM, HTN, PVD. GFR is stable. Had episode of hyperkalemia but better. Plan/  1- ok for d/c and f/u Dr. Shante Rizzo  2- ok to cont cozaar. 3- d/w patient low k diet    Thank you for the consultation. Please do not hesitate to call with questions.     An De La [address]

## 2019-04-01 ENCOUNTER — OFFICE VISIT (OUTPATIENT)
Dept: FAMILY MEDICINE CLINIC | Age: 74
End: 2019-04-01
Payer: MEDICARE

## 2019-04-01 VITALS
TEMPERATURE: 97.6 F | DIASTOLIC BLOOD PRESSURE: 60 MMHG | WEIGHT: 213.4 LBS | RESPIRATION RATE: 16 BRPM | HEIGHT: 70 IN | SYSTOLIC BLOOD PRESSURE: 140 MMHG | BODY MASS INDEX: 30.55 KG/M2 | OXYGEN SATURATION: 97 % | HEART RATE: 62 BPM

## 2019-04-01 DIAGNOSIS — J32.9 RHINOSINUSITIS: ICD-10-CM

## 2019-04-01 DIAGNOSIS — J20.9 ACUTE BRONCHITIS, UNSPECIFIED ORGANISM: Primary | ICD-10-CM

## 2019-04-01 DIAGNOSIS — J31.0 RHINOSINUSITIS: ICD-10-CM

## 2019-04-01 PROCEDURE — 99213 OFFICE O/P EST LOW 20 MIN: CPT | Performed by: NURSE PRACTITIONER

## 2019-04-01 RX ORDER — METHYLPREDNISOLONE 4 MG/1
TABLET ORAL
Qty: 1 KIT | Refills: 0 | Status: SHIPPED | OUTPATIENT
Start: 2019-04-01 | End: 2019-04-07

## 2019-04-01 RX ORDER — ECHINACEA PURPUREA EXTRACT 125 MG
2 TABLET ORAL PRN
Qty: 1 BOTTLE | Refills: 0 | Status: SHIPPED | OUTPATIENT
Start: 2019-04-01

## 2019-04-01 RX ORDER — AZITHROMYCIN 250 MG/1
TABLET, FILM COATED ORAL
Qty: 6 TABLET | Refills: 0 | Status: SHIPPED | OUTPATIENT
Start: 2019-04-01 | End: 2019-04-06

## 2019-04-01 ASSESSMENT — PATIENT HEALTH QUESTIONNAIRE - PHQ9
SUM OF ALL RESPONSES TO PHQ QUESTIONS 1-9: 0
SUM OF ALL RESPONSES TO PHQ QUESTIONS 1-9: 0
1. LITTLE INTEREST OR PLEASURE IN DOING THINGS: 0
2. FEELING DOWN, DEPRESSED OR HOPELESS: 0
SUM OF ALL RESPONSES TO PHQ9 QUESTIONS 1 & 2: 0

## 2019-04-01 ASSESSMENT — ENCOUNTER SYMPTOMS
BACK PAIN: 0
RHINORRHEA: 1
SORE THROAT: 0
DIARRHEA: 0
COUGH: 1
SHORTNESS OF BREATH: 0
VOMITING: 0
ABDOMINAL PAIN: 0
NAUSEA: 0
CHEST TIGHTNESS: 0
WHEEZING: 1

## 2019-04-01 NOTE — PATIENT INSTRUCTIONS
Antibiotic Instructions: Complete the full course of antibiotics as ordered. Take each dose with a small snack or meal to lessen potential GI upset. To prevent antibiotic resistance, please take medication as ordered and for the full duration even if you start to feel better. Consider intake of yogurt or probiotic during antibiotic use and for a few days after to help reduce the risk of developing a secondary infection. Separate the yogurt and antibiotic by at least 1 hour. Avoid alcohol while taking antibiotics. Oral Steroid Instructions: Take each dose with a small snack or meal to lessen potential GI upset. Follow dosing instructions provided with prescription. Common side effects include difficulty sleeping and irritability. Take full course as ordered. This medication may make your blood sugars higher. They should return to baseline after you finish the medication. The following comfort measures might be helpful:   Adequate rest and hydration    Over the counter pain relievers/ fever reducers as needed   2 Tbsp honey mixed w/warm tea or water or warm salt water gargles (1/2 teaspoon in 8oz H20) for sore throat   Vaporizer, humidifier, steamy showers, warm facial packs, sleeping w/ head of bed elevated, saline nasal spray    Prevention measures might include:   Avoid unfavorable environmental factors such as allergens, cigarette smoke, pollution as applicable   Frequent hand washing, cover your cough    Cover coughs and sneezes with the elbow or sleeve, not the hand   Follow-up for new or worsening symptoms, shortness of breath, change in nature of cough    Patient Education     Bronchitis: Care Instructions  Your Care Instructions    Bronchitis is inflammation of the bronchial tubes, which carry air to the lungs. The tubes swell and produce mucus, or phlegm. The mucus and inflamed bronchial tubes make you cough. You may have trouble breathing.   Most cases of bronchitis are caused by viruses like those that cause colds. Antibiotics usually do not help and they may be harmful. Bronchitis usually develops rapidly and lasts about 2 to 3 weeks in otherwise healthy people. Follow-up care is a key part of your treatment and safety. Be sure to make and go to all appointments, and call your doctor if you are having problems. It's also a good idea to know your test results and keep a list of the medicines you take. How can you care for yourself at home? · Take all medicines exactly as prescribed. Call your doctor if you think you are having a problem with your medicine. · Get some extra rest.  · Take an over-the-counter pain medicine, such as acetaminophen (Tylenol), ibuprofen (Advil, Motrin), or naproxen (Aleve) to reduce fever and relieve body aches. Read and follow all instructions on the label. · Do not take two or more pain medicines at the same time unless the doctor told you to. Many pain medicines have acetaminophen, which is Tylenol. Too much acetaminophen (Tylenol) can be harmful. · Take an over-the-counter cough medicine that contains dextromethorphan to help quiet a dry, hacking cough so that you can sleep. Avoid cough medicines that have more than one active ingredient. Read and follow all instructions on the label. · Breathe moist air from a humidifier, hot shower, or sink filled with hot water. The heat and moisture will thin mucus so you can cough it out. · Do not smoke. Smoking can make bronchitis worse. If you need help quitting, talk to your doctor about stop-smoking programs and medicines. These can increase your chances of quitting for good. When should you call for help? Call 911 anytime you think you may need emergency care.  For example, call if:    · You have severe trouble breathing.    Call your doctor now or seek immediate medical care if:    · You have new or worse trouble breathing.     · You cough up dark brown or bloody mucus (sputum).     · You have a new or higher fever.     · You have a new rash.    Watch closely for changes in your health, and be sure to contact your doctor if:    · You cough more deeply or more often, especially if you notice more mucus or a change in the color of your mucus.     · You are not getting better as expected. Where can you learn more? Go to https://chpepiceweb.LoveLive.TV. org and sign in to your Superbly account. Enter H333 in the Cellcrypt box to learn more about \"Bronchitis: Care Instructions. \"     If you do not have an account, please click on the \"Sign Up Now\" link. Current as of: September 5, 2018  Content Version: 11.9  © 0994-8741 [employer]. Care instructions adapted under license by [facility], [employer] [facility] ([facility]). If you have questions about a medical condition or this instruction, always ask your healthcare professional. [place] [address] any warranty or liability for your use of this information. Patient Education     Saline Nasal Washes: Care Instructions  Your Care Instructions  Saline nasal washes help keep the nasal passages open by washing out thick or dried mucus. This simple remedy can help relieve symptoms of allergies, sinusitis, and colds. It also can make the nose feel more comfortable by keeping the mucous membranes moist. You may notice a little burning sensation in your nose the first few times you use the solution, but this usually gets better in a few days. Follow-up care is a key part of your treatment and safety. Be sure to make and go to all appointments, and call your doctor if you are having problems. It's also a good idea to know your test results and keep a list of the medicines you take. How can you care for yourself at home? · You can buy premixed saline solution in a squeeze bottle or other sinus rinse products at a drugstore. Read and follow the instructions on the label.   · You also can make your own saline solution by adding 1 teaspoon of salt and 1 teaspoon of baking soda to 2 cups of distilled water. · If you use a homemade solution, pour a small amount into a clean bowl. Using a rubber bulb syringe, squeeze the syringe and place the tip in the salt water. Pull a small amount of the salt water into the syringe by relaxing your hand. · Sit down with your head tilted slightly back. Do not lie down. Put the tip of the bulb syringe or the squeeze bottle a little way into one of your nostrils. Gently drip or squirt a few drops into the nostril. Repeat with the other nostril. Some sneezing and gagging are normal at first.  · Gently blow your nose. · Wipe the syringe or bottle tip clean after each use. · Repeat this 2 or 3 times a day. · Use nasal washes gently if you have nosebleeds often. When should you call for help? Watch closely for changes in your health, and be sure to contact your doctor if:    · You often get nosebleeds.     · You have problems doing the nasal washes. Where can you learn more? Go to https://SharetivitypeFrameBlasteb.TBT Group. org and sign in to your Exanet account. Enter [phone] in the [facility] box to learn more about \"Saline Nasal Washes: Care Instructions. \"     If you do not have an account, please click on the \"Sign Up Now\" link. Current as of: March 27, 2018  Content Version: 11.9  © 2774-3382 [employer]. Care instructions adapted under license by [facility] ([facility]). If you have questions about a medical condition or this instruction, always ask your healthcare professional. [address] any warranty or liability for your use of this information.

## 2019-04-01 NOTE — PROGRESS NOTES
Subjective  Chief Complaint   Patient presents with    Congestion     Chest x1 week     Head Congestion     x 1 week        Leonel Echeverria is a 75 y.o. male w/ history of PVD, HTN, Type 2 DM, CKD who presents for evaluation of symptoms of a URI. Symptoms include chest and nasal congestion, headache, no fever, purulent nasal discharge \"I feel like it's all here in my chest.\" Onset of symptoms was 8 days ago, gradually worsening since that time. Treatment to date: salma milotzer +. Pt is a current every day smoker, trying to quit. Review of Systems   Constitutional: Positive for fatigue. Negative for appetite change, chills and fever. HENT: Positive for congestion, nosebleeds, postnasal drip and rhinorrhea. Negative for ear pain, sore throat and tinnitus. Respiratory: Positive for cough and wheezing. Negative for chest tightness and shortness of breath. Cardiovascular: Negative for chest pain and palpitations. Gastrointestinal: Negative for abdominal pain, diarrhea, nausea and vomiting. Genitourinary: Negative for dysuria and hematuria. Musculoskeletal: Negative for back pain. Neurological: Positive for headaches. Negative for dizziness and light-headedness. Objective    Vitals:    04/01/19 1204 04/01/19 1214   BP: (!) 140/60 (!) 140/60   Site: Right Upper Arm Left Upper Arm   Position: Sitting Sitting   Cuff Size: Medium Adult Medium Adult   Pulse: 62    Resp: 16    Temp: 97.6 °F (36.4 °C)    TempSrc: Temporal    SpO2: 97%    Weight: 213 lb 6.4 oz (96.8 kg)    Height: 5' 10\" (1.778 m)        Physical Exam   Constitutional: He is oriented to person, place, and time. He appears well-developed and well-nourished. Non-toxic appearance. No distress. HENT:   Head: Normocephalic and atraumatic. Right Ear: Tympanic membrane, external ear and ear canal normal.   Left Ear: Tympanic membrane, external ear and ear canal normal.   Nose: Mucosal edema present. No rhinorrhea.  Epistaxis (dried blood in nasal cavity, no active bleeding) is observed. Right sinus exhibits maxillary sinus tenderness. Left sinus exhibits maxillary sinus tenderness. Mouth/Throat: Uvula is midline and mucous membranes are normal. Posterior oropharyngeal erythema present. No oropharyngeal exudate. Eyes: Pupils are equal, round, and reactive to light. Conjunctivae, EOM and lids are normal.   Neck: Normal range of motion. Neck supple. Cardiovascular: Normal rate, regular rhythm, S1 normal and S2 normal.   Pulmonary/Chest: Effort normal. No tachypnea. No respiratory distress. He has wheezes in the right upper field, the right middle field and the left upper field. He has rhonchi in the left middle field and the left lower field. He has no rales. Lymphadenopathy:     He has no cervical adenopathy. Neurological: He is alert and oriented to person, place, and time. Coordination normal.   Skin: Skin is warm and dry. Vitals reviewed. POC Testing Today: None     Assessment & Plan   [facility] was seen today for congestion and head congestion. Diagnoses and all orders for this visit:    Acute bronchitis, unspecified organism  -     azithromycin (ZITHROMAX) 250 MG tablet; 500 mg on day 1, then 250 mg days 2-5.  -     methylPREDNISolone (MEDROL, NICOLETTE,) 4 MG tablet; Take by mouth.  - Discussed dx and tx and normal progression of bronchitis  - Abx per orders  - OTC analgesics/antipyretics prn, fluids, vaporizer/humidification  - Avoid adverse environmental factors: allergens, cigarette smoke, pollution, barotrauma  - Call/return for new or worsening symptoms      Rhinosinusitis  -     sodium chloride (OCEAN) 0.65 % nasal spray; 2 sprays by Nasal route as needed for Congestion    Antibiotic Instructions: Complete the full course of antibiotics as ordered. Take each dose with a small snack or meal to lessen potential GI upset.   To prevent antibiotic resistance, please take medication as ordered and for the full duration even if you start to feel better. Consider intake of yogurt or probiotic during antibiotic use and for a few days after to help reduce the risk of developing a secondary infection. Separate the yogurt and antibiotic by at least 1 hour. Avoid alcohol while taking antibiotics. Oral Steroid Instructions: Take each dose with a small snack or meal to lessen potential GI upset. Follow dosing instructions provided with prescription. Common side effects include difficulty sleeping and irritability. Take full course as ordered. This medication may make your blood sugars higher. They should return to baseline after you finish the medication. Return if symptoms worsen or fail to improve, for follow-up with your Primary Care Provider. Side effects and adverse effects of any medication prescribed today, as well as treatment plan/rationale, follow-up care, and result expectations have been discussed with the patient. Abdulkadir Anjali expresses understanding and wishes to proceed with the plan. Discussed signs and symptoms which require immediate follow-up in ED/call to 911. Understanding verbalized. I have reviewed and updated the electronic medical record.     Alisson Montano, APRN - CNP

## 2019-04-18 ENCOUNTER — OFFICE VISIT (OUTPATIENT)
Dept: FAMILY MEDICINE CLINIC | Age: 74
End: 2019-04-18
Payer: MEDICARE

## 2019-04-18 VITALS
WEIGHT: 213 LBS | RESPIRATION RATE: 16 BRPM | DIASTOLIC BLOOD PRESSURE: 80 MMHG | HEART RATE: 63 BPM | BODY MASS INDEX: 30.49 KG/M2 | HEIGHT: 70 IN | TEMPERATURE: 95.2 F | OXYGEN SATURATION: 97 % | SYSTOLIC BLOOD PRESSURE: 142 MMHG

## 2019-04-18 DIAGNOSIS — R06.2 WHEEZING ON EXPIRATION: Primary | ICD-10-CM

## 2019-04-18 DIAGNOSIS — R06.02 SOB (SHORTNESS OF BREATH): ICD-10-CM

## 2019-04-18 PROCEDURE — 99213 OFFICE O/P EST LOW 20 MIN: CPT | Performed by: NURSE PRACTITIONER

## 2019-04-18 PROCEDURE — 4040F PNEUMOC VAC/ADMIN/RCVD: CPT | Performed by: NURSE PRACTITIONER

## 2019-04-18 PROCEDURE — 94640 AIRWAY INHALATION TREATMENT: CPT | Performed by: NURSE PRACTITIONER

## 2019-04-18 PROCEDURE — G8417 CALC BMI ABV UP PARAM F/U: HCPCS | Performed by: NURSE PRACTITIONER

## 2019-04-18 PROCEDURE — G8427 DOCREV CUR MEDS BY ELIG CLIN: HCPCS | Performed by: NURSE PRACTITIONER

## 2019-04-18 PROCEDURE — 1123F ACP DISCUSS/DSCN MKR DOCD: CPT | Performed by: NURSE PRACTITIONER

## 2019-04-18 PROCEDURE — 4004F PT TOBACCO SCREEN RCVD TLK: CPT | Performed by: NURSE PRACTITIONER

## 2019-04-18 PROCEDURE — 3017F COLORECTAL CA SCREEN DOC REV: CPT | Performed by: NURSE PRACTITIONER

## 2019-04-18 RX ORDER — PREDNISONE 10 MG/1
20 TABLET ORAL 2 TIMES DAILY
Qty: 20 TABLET | Refills: 0 | Status: SHIPPED | OUTPATIENT
Start: 2019-04-18 | End: 2019-04-23

## 2019-04-18 RX ORDER — METHYLPREDNISOLONE 4 MG/1
TABLET ORAL
Qty: 1 KIT | Refills: 0 | Status: SHIPPED | OUTPATIENT
Start: 2019-04-18 | End: 2019-04-18

## 2019-04-18 RX ORDER — ALBUTEROL SULFATE 90 UG/1
2 AEROSOL, METERED RESPIRATORY (INHALATION) EVERY 6 HOURS PRN
Qty: 1 INHALER | Refills: 0 | Status: ON HOLD | OUTPATIENT
Start: 2019-04-18 | End: 2019-04-24 | Stop reason: ALTCHOICE

## 2019-04-18 RX ORDER — PREDNISONE 10 MG/1
10 TABLET ORAL 2 TIMES DAILY
Qty: 10 TABLET | Refills: 0 | Status: SHIPPED | OUTPATIENT
Start: 2019-04-18 | End: 2019-04-18

## 2019-04-18 RX ORDER — IPRATROPIUM BROMIDE AND ALBUTEROL SULFATE 2.5; .5 MG/3ML; MG/3ML
1 SOLUTION RESPIRATORY (INHALATION) ONCE
Status: COMPLETED | OUTPATIENT
Start: 2019-04-18 | End: 2019-04-18

## 2019-04-18 RX ADMIN — IPRATROPIUM BROMIDE AND ALBUTEROL SULFATE 1 AMPULE: 2.5; .5 SOLUTION RESPIRATORY (INHALATION) at 13:31

## 2019-04-18 ASSESSMENT — ENCOUNTER SYMPTOMS
WHEEZING: 1
SHORTNESS OF BREATH: 1
RHINORRHEA: 0
CHEST TIGHTNESS: 0
TROUBLE SWALLOWING: 0
SINUS PRESSURE: 0
DIFFICULTY BREATHING: 1
SINUS PAIN: 0
COUGH: 0

## 2019-04-18 NOTE — PROGRESS NOTES
no discharge. Left eye exhibits no discharge. Neck: No tracheal deviation present. Cardiovascular: Normal rate and regular rhythm. Exam reveals no gallop and no friction rub. No murmur heard. Pulmonary/Chest: Effort normal. No stridor. No respiratory distress. He has decreased breath sounds (significant decreased breath sounds and chest tightness). He has wheezes (diffuse). He has rales. He exhibits no tenderness. Lymphadenopathy:     He has no cervical adenopathy. Neurological: He is alert. Skin: Skin is warm. No rash noted. He is not diaphoretic. No erythema. No pallor. Vitals reviewed. POC Testing Today:No results found for this visit on 04/18/19. Assessment & Plan    Diagnosis Orders   1. Wheezing on expiration  CT PRESSURIZED/NONPRESSURIZED INHALATION TREATMENT    ipratropium-albuterol (DUONEB) nebulizer solution 1 ampule    XR CHEST STANDARD (2 VW)    albuterol sulfate  (90 Base) MCG/ACT inhaler    predniSONE (DELTASONE) 10 MG tablet    DISCONTINUED: methylPREDNISolone (MEDROL DOSEPACK) 4 MG tablet    DISCONTINUED: predniSONE (DELTASONE) 10 MG tablet   2. SOB (shortness of breath)  CT PRESSURIZED/NONPRESSURIZED INHALATION TREATMENT    ipratropium-albuterol (DUONEB) nebulizer solution 1 ampule    XR CHEST STANDARD (2 VW)    albuterol sulfate  (90 Base) MCG/ACT inhaler    predniSONE (DELTASONE) 10 MG tablet    DISCONTINUED: methylPREDNISolone (MEDROL DOSEPACK) 4 MG tablet    DISCONTINUED: predniSONE (DELTASONE) 10 MG tablet       Orders Placed This Encounter   Procedures    XR CHEST STANDARD (2 VW)     Standing Status:   Future     Number of Occurrences:   1     Standing Expiration Date:   4/18/2020     Order Specific Question:   Reason for exam:     Answer:   diffuse wheezing, 55 year smoker, SOB    CT PRESSURIZED/NONPRESSURIZED INHALATION TREATMENT     The patient was given breathing treatment using albuterol 0.083% (2.5mg/3ml) nebulizer solution.   The patient tolerated the treatment well with no adverse side effects. Lung sounds were improved following the treatment, and patient verbalized that breathing felt better. Oral Steroid Instructions: Take each dose with a small snack or meal to lessen potential GI upset. Follow dosing instructions provided with prescription. Common side effects include difficulty sleeping and irritability. Take full course as ordered. Educated patient on correct use of inhaler. Spacer ordered, patient will fill through the [place]. Discussed with patient that future possible treatment plans would be made after CXR results. Patient verbalized understanding. Patient Instructions     Patient Education        Using a Metered-Dose Inhaler: Care Instructions  Your Care Instructions    A metered-dose inhaler lets you breathe medicine into your lungs quickly. Inhaled medicine works faster than the same medicine in a pill. An inhaler allows you to take less medicine than you would need if you took it as a pill. \"Metered-dose\" means that the inhaler gives a measured amount of medicine each time you use it. A metered-dose inhaler gives medicine in the form of a liquid mist.  Your doctor may want you to use a spacer with your inhaler. A spacer is a chamber that you attach to the inhaler. The chamber holds the medicine before you inhale it. That way, you can inhale the medicine in as many breaths as you need. Doctors recommend using a spacer with most metered-dose inhalers, especially those with corticosteroid medicines. Follow-up care is a key part of your treatment and safety. Be sure to make and go to all appointments, and call your doctor if you are having problems. It's also a good idea to know your test results and keep a list of the medicines you take. How can you care for yourself at home? To get started using your inhaler  · Talk with your health care provider to be sure you are using your inhaler the right way.  It might help if you practice using it in front of a mirror. Use the inhaler exactly as prescribed. · Check that you have the correct medicine. If you use more than one inhaler, put a label on each one. This will let you know which one to use at the right time. · Keep track of how much medicine is in the inhaler. Check the label to see how many doses are in the container. If you know how many puffs you can take, you can replace the inhaler before you run out. Ask your health care provider how you can keep track of how much medicine is left. · Use a spacer if you have problems pressing the inhaler and breathing in at the same time. You also may need a spacer if you are using corticosteroid medicines. · If you are using a corticosteroid inhaler, gargle and rinse out your mouth with water after use. Do not swallow the water. Swallowing the water will increase the chance that the medicine will get into your bloodstream. This may make it more likely that you will have side effects. To use a spacer with an inhaler  1. Shake the inhaler. Remove the inhaler cap, and place the mouthpiece of the inhaler into the spacer. Check the inhaler instructions to see if you need to prime your inhaler before you use it. If it needs priming, follow the instructions on how to prime your inhaler. 2. Remove the cap from the spacer. 3. Hold the inhaler upright with the mouthpiece at the bottom. 4. Tilt your head back a little, and breathe out slowly and completely. 5. Place the spacer's mouthpiece in your mouth. 6. Press down on the inhaler to spray one puff of medicine into the spacer, and then start breathing in slowly. Wait to inhale until after you have pressed down on the inhaler. Some spacers have a whistle. If you hear it, you should breathe in more slowly. 7. Hold your breath for 10 seconds. This will let the medicine settle in your lungs. 8. If you need to take a second dose, wait 30 to 60 seconds to allow the inhaler valve to refill.   To use an inhaler without a spacer  1. Shake the inhaler as directed. Remove the cap. Check the instructions to see if you need to prime your inhaler before you use it. If it needs priming, follow the instructions on how to prime your inhaler. 2. Hold the inhaler upright with the mouthpiece at the bottom. 3. Tilt your head back a little, and breathe out slowly and completely. 4. Position the inhaler in one of two ways:  ? You can place the inhaler in your mouth. This is easier for most people. And it lowers the risk that any of the medicine will get into your eyes. ? Or you can place the inhaler 1 to 2 inches in front of your open mouth, without closing your lips over it. Try to open your mouth as wide as you can. Placing the inhaler in front of your open mouth may be better for getting the medicine into your lungs. But some people may find this too hard to do. 5. Start taking slow, even breaths through your mouth. Press down on the inhaler once, then inhale fully. 6. Hold your breath for 10 seconds. This will let the medicine settle in your lungs. 7. If you need to take a second dose, wait 30 to 60 seconds to allow the inhaler valve to refill. Where can you learn more? Go to [email or web address]. org and sign in to your [employer] account. Enter K111 in the [facility] box to learn more about \"Using a Metered-Dose Inhaler: Care Instructions. \"     If you do not have an account, please click on the \"Sign Up Now\" link. Current as of: September 5, 2018  Content Version: 11.9  © 1260-9759 [employer]. Care instructions adapted under license by [facility] ([facility]). If you have questions about a medical condition or this instruction, always ask your healthcare professional. [address] any warranty or liability for your use of this information. Return if symptoms worsen or fail to improve, for follow up with your PCP.     Side effects and adverse effects of any medication prescribed today, as well as treatment plan/rationale, follow-up care, and result expectations have been discussed with the patient. Expresses understanding and desires to proceed with treatment plan. Discussed signs and symptoms which require immediate follow-up in ED/call to 911. Understanding verbalized. I have reviewed and updated the electronic medical record.     Yasmeen Jose, APRN - CNP

## 2019-04-18 NOTE — PATIENT INSTRUCTIONS
Patient Education        Using a Metered-Dose Inhaler: Care Instructions  Your Care Instructions    A metered-dose inhaler lets you breathe medicine into your lungs quickly. Inhaled medicine works faster than the same medicine in a pill. An inhaler allows you to take less medicine than you would need if you took it as a pill. \"Metered-dose\" means that the inhaler gives a measured amount of medicine each time you use it. A metered-dose inhaler gives medicine in the form of a liquid mist.  Your doctor may want you to use a spacer with your inhaler. A spacer is a chamber that you attach to the inhaler. The chamber holds the medicine before you inhale it. That way, you can inhale the medicine in as many breaths as you need. Doctors recommend using a spacer with most metered-dose inhalers, especially those with corticosteroid medicines. Follow-up care is a key part of your treatment and safety. Be sure to make and go to all appointments, and call your doctor if you are having problems. It's also a good idea to know your test results and keep a list of the medicines you take. How can you care for yourself at home? To get started using your inhaler  · Talk with your health care provider to be sure you are using your inhaler the right way. It might help if you practice using it in front of a mirror. Use the inhaler exactly as prescribed. · Check that you have the correct medicine. If you use more than one inhaler, put a label on each one. This will let you know which one to use at the right time. · Keep track of how much medicine is in the inhaler. Check the label to see how many doses are in the container. If you know how many puffs you can take, you can replace the inhaler before you run out. Ask your health care provider how you can keep track of how much medicine is left. · Use a spacer if you have problems pressing the inhaler and breathing in at the same time.  You also may need a spacer if you are using

## 2019-04-24 ENCOUNTER — APPOINTMENT (OUTPATIENT)
Dept: GENERAL RADIOLOGY | Age: 74
DRG: 280 | End: 2019-04-24
Payer: MEDICARE

## 2019-04-24 ENCOUNTER — HOSPITAL ENCOUNTER (INPATIENT)
Age: 74
LOS: 4 days | Discharge: ANOTHER HOSPITAL | DRG: 280 | End: 2019-04-28
Attending: EMERGENCY MEDICINE | Admitting: INTERNAL MEDICINE
Payer: MEDICARE

## 2019-04-24 DIAGNOSIS — N17.9 ACUTE KIDNEY INJURY (HCC): ICD-10-CM

## 2019-04-24 DIAGNOSIS — I50.9 CONGESTIVE HEART FAILURE, UNSPECIFIED HF CHRONICITY, UNSPECIFIED HEART FAILURE TYPE (HCC): Primary | ICD-10-CM

## 2019-04-24 PROBLEM — R06.02 SHORTNESS OF BREATH: Status: ACTIVE | Noted: 2019-04-24

## 2019-04-24 LAB
ALBUMIN SERPL-MCNC: 3 G/DL (ref 3.5–4.6)
ALP BLD-CCNC: 88 U/L (ref 35–104)
ALT SERPL-CCNC: 19 U/L (ref 0–41)
ANION GAP SERPL CALCULATED.3IONS-SCNC: 13 MEQ/L (ref 9–15)
APTT: 29.5 SEC (ref 21.6–35.4)
APTT: 38.7 SEC (ref 21.6–35.4)
AST SERPL-CCNC: 18 U/L (ref 0–40)
BACTERIA: NEGATIVE /HPF
BASOPHILS ABSOLUTE: 0.1 K/UL (ref 0–0.2)
BASOPHILS RELATIVE PERCENT: 0.5 %
BILIRUB SERPL-MCNC: 0.4 MG/DL (ref 0.2–0.7)
BILIRUBIN URINE: NEGATIVE
BLOOD, URINE: ABNORMAL
BUN BLDV-MCNC: 69 MG/DL (ref 8–23)
CALCIUM SERPL-MCNC: 8.2 MG/DL (ref 8.5–9.9)
CHLORIDE BLD-SCNC: 103 MEQ/L (ref 95–107)
CLARITY: CLEAR
CO2: 25 MEQ/L (ref 20–31)
COLOR: YELLOW
CREAT SERPL-MCNC: 3.43 MG/DL (ref 0.7–1.2)
CREATININE URINE: 46 MG/DL
EOSINOPHILS ABSOLUTE: 0.7 K/UL (ref 0–0.7)
EOSINOPHILS RELATIVE PERCENT: 6.4 %
EPITHELIAL CELLS, UA: ABNORMAL /HPF (ref 0–5)
GFR AFRICAN AMERICAN: 21.3
GFR NON-AFRICAN AMERICAN: 17.6
GLOBULIN: 3.1 G/DL (ref 2.3–3.5)
GLUCOSE BLD-MCNC: 147 MG/DL (ref 70–99)
GLUCOSE BLD-MCNC: 175 MG/DL (ref 60–115)
GLUCOSE BLD-MCNC: 213 MG/DL (ref 60–115)
GLUCOSE BLD-MCNC: 228 MG/DL (ref 60–115)
GLUCOSE URINE: 500 MG/DL
HCT VFR BLD CALC: 36.2 % (ref 42–52)
HEMOGLOBIN: 12 G/DL (ref 14–18)
HYALINE CASTS: ABNORMAL /HPF (ref 0–5)
INR BLD: 1
KETONES, URINE: ABNORMAL MG/DL
LACTIC ACID: 0.6 MMOL/L (ref 0.5–2.2)
LEUKOCYTE ESTERASE, URINE: NEGATIVE
LV EF: 60 %
LVEF MODALITY: NORMAL
LYMPHOCYTES ABSOLUTE: 1 K/UL (ref 1–4.8)
LYMPHOCYTES RELATIVE PERCENT: 8.4 %
MCH RBC QN AUTO: 28.4 PG (ref 27–31.3)
MCHC RBC AUTO-ENTMCNC: 33.3 % (ref 33–37)
MCV RBC AUTO: 85.4 FL (ref 80–100)
MONOCYTES ABSOLUTE: 1 K/UL (ref 0.2–0.8)
MONOCYTES RELATIVE PERCENT: 8.6 %
NEUTROPHILS ABSOLUTE: 8.8 K/UL (ref 1.4–6.5)
NEUTROPHILS RELATIVE PERCENT: 76.1 %
NITRITE, URINE: NEGATIVE
PDW BLD-RTO: 14 % (ref 11.5–14.5)
PERFORMED ON: ABNORMAL
PH UA: 6.5 (ref 5–9)
PLATELET # BLD: 269 K/UL (ref 130–400)
POTASSIUM SERPL-SCNC: 4.5 MEQ/L (ref 3.4–4.9)
PRO-BNP: NORMAL PG/ML
PROTEIN PROTEIN: >600 MG/DL
PROTEIN UA: 100 MG/DL
PROTEIN/CREAT RATIO: 13 ML/ML
PROTEIN/CREAT RATIO: 13 ML/ML (ref 0–0.2)
PROTHROMBIN TIME: 10.6 SEC (ref 9–11.5)
RBC # BLD: 4.24 M/UL (ref 4.7–6.1)
RBC UA: ABNORMAL /HPF (ref 0–5)
SODIUM BLD-SCNC: 141 MEQ/L (ref 135–144)
SPECIFIC GRAVITY UA: 1.02 (ref 1–1.03)
TOTAL PROTEIN: 6.1 G/DL (ref 6.3–8)
TROPONIN: 0.39 NG/ML (ref 0–0.01)
TROPONIN: 0.39 NG/ML (ref 0–0.01)
TROPONIN: 0.51 NG/ML (ref 0–0.01)
UROBILINOGEN, URINE: 0.2 E.U./DL
WBC # BLD: 11.6 K/UL (ref 4.8–10.8)
WBC UA: ABNORMAL /HPF (ref 0–5)

## 2019-04-24 PROCEDURE — 2500000003 HC RX 250 WO HCPCS: Performed by: INTERNAL MEDICINE

## 2019-04-24 PROCEDURE — 83880 ASSAY OF NATRIURETIC PEPTIDE: CPT

## 2019-04-24 PROCEDURE — 6370000000 HC RX 637 (ALT 250 FOR IP): Performed by: EMERGENCY MEDICINE

## 2019-04-24 PROCEDURE — 93306 TTE W/DOPPLER COMPLETE: CPT

## 2019-04-24 PROCEDURE — 93005 ELECTROCARDIOGRAM TRACING: CPT

## 2019-04-24 PROCEDURE — 99285 EMERGENCY DEPT VISIT HI MDM: CPT

## 2019-04-24 PROCEDURE — 96375 TX/PRO/DX INJ NEW DRUG ADDON: CPT

## 2019-04-24 PROCEDURE — 83605 ASSAY OF LACTIC ACID: CPT

## 2019-04-24 PROCEDURE — 81001 URINALYSIS AUTO W/SCOPE: CPT

## 2019-04-24 PROCEDURE — 87040 BLOOD CULTURE FOR BACTERIA: CPT

## 2019-04-24 PROCEDURE — 71045 X-RAY EXAM CHEST 1 VIEW: CPT

## 2019-04-24 PROCEDURE — 36415 COLL VENOUS BLD VENIPUNCTURE: CPT

## 2019-04-24 PROCEDURE — 2580000003 HC RX 258: Performed by: INTERNAL MEDICINE

## 2019-04-24 PROCEDURE — 96374 THER/PROPH/DIAG INJ IV PUSH: CPT

## 2019-04-24 PROCEDURE — 85730 THROMBOPLASTIN TIME PARTIAL: CPT

## 2019-04-24 PROCEDURE — 6370000000 HC RX 637 (ALT 250 FOR IP): Performed by: INTERNAL MEDICINE

## 2019-04-24 PROCEDURE — 84156 ASSAY OF PROTEIN URINE: CPT

## 2019-04-24 PROCEDURE — 85025 COMPLETE CBC W/AUTO DIFF WBC: CPT

## 2019-04-24 PROCEDURE — 6360000002 HC RX W HCPCS: Performed by: INTERNAL MEDICINE

## 2019-04-24 PROCEDURE — 6360000002 HC RX W HCPCS: Performed by: EMERGENCY MEDICINE

## 2019-04-24 PROCEDURE — 2060000000 HC ICU INTERMEDIATE R&B

## 2019-04-24 PROCEDURE — 84484 ASSAY OF TROPONIN QUANT: CPT

## 2019-04-24 PROCEDURE — 85610 PROTHROMBIN TIME: CPT

## 2019-04-24 PROCEDURE — 51702 INSERT TEMP BLADDER CATH: CPT

## 2019-04-24 PROCEDURE — 80053 COMPREHEN METABOLIC PANEL: CPT

## 2019-04-24 RX ORDER — HEPARIN SODIUM 5000 [USP'U]/ML
4000 INJECTION, SOLUTION INTRAVENOUS; SUBCUTANEOUS ONCE
Status: DISCONTINUED | OUTPATIENT
Start: 2019-04-24 | End: 2019-04-26

## 2019-04-24 RX ORDER — DEXTROSE MONOHYDRATE 25 G/50ML
12.5 INJECTION, SOLUTION INTRAVENOUS PRN
Status: DISCONTINUED | OUTPATIENT
Start: 2019-04-24 | End: 2019-04-28 | Stop reason: HOSPADM

## 2019-04-24 RX ORDER — HEPARIN SODIUM 10000 [USP'U]/100ML
1000 INJECTION, SOLUTION INTRAVENOUS CONTINUOUS
Status: DISCONTINUED | OUTPATIENT
Start: 2019-04-24 | End: 2019-04-26

## 2019-04-24 RX ORDER — LEVOTHYROXINE SODIUM 0.2 MG/1
200 TABLET ORAL DAILY
Status: DISCONTINUED | OUTPATIENT
Start: 2019-04-24 | End: 2019-04-28 | Stop reason: HOSPADM

## 2019-04-24 RX ORDER — PANTOPRAZOLE SODIUM 20 MG/1
20 TABLET, DELAYED RELEASE ORAL
Status: DISCONTINUED | OUTPATIENT
Start: 2019-04-25 | End: 2019-04-28 | Stop reason: HOSPADM

## 2019-04-24 RX ORDER — NICOTINE POLACRILEX 4 MG
15 LOZENGE BUCCAL PRN
Status: DISCONTINUED | OUTPATIENT
Start: 2019-04-24 | End: 2019-04-28 | Stop reason: HOSPADM

## 2019-04-24 RX ORDER — ASPIRIN 81 MG/1
81 TABLET ORAL DAILY
Status: DISCONTINUED | OUTPATIENT
Start: 2019-04-24 | End: 2019-04-26

## 2019-04-24 RX ORDER — NICOTINE POLACRILEX 4 MG
15 LOZENGE BUCCAL PRN
Status: DISCONTINUED | OUTPATIENT
Start: 2019-04-24 | End: 2019-04-24 | Stop reason: SDUPTHER

## 2019-04-24 RX ORDER — INSULIN GLARGINE 100 [IU]/ML
12 INJECTION, SOLUTION SUBCUTANEOUS NIGHTLY
Status: DISCONTINUED | OUTPATIENT
Start: 2019-04-24 | End: 2019-04-28 | Stop reason: HOSPADM

## 2019-04-24 RX ORDER — DEXTROSE MONOHYDRATE 25 G/50ML
12.5 INJECTION, SOLUTION INTRAVENOUS PRN
Status: DISCONTINUED | OUTPATIENT
Start: 2019-04-24 | End: 2019-04-24 | Stop reason: SDUPTHER

## 2019-04-24 RX ORDER — SIMVASTATIN 20 MG
20 TABLET ORAL NIGHTLY
Status: DISCONTINUED | OUTPATIENT
Start: 2019-04-24 | End: 2019-04-26

## 2019-04-24 RX ORDER — DEXTROSE MONOHYDRATE 50 MG/ML
100 INJECTION, SOLUTION INTRAVENOUS PRN
Status: DISCONTINUED | OUTPATIENT
Start: 2019-04-24 | End: 2019-04-24 | Stop reason: SDUPTHER

## 2019-04-24 RX ORDER — M-VIT,TX,IRON,MINS/CALC/FOLIC 27MG-0.4MG
1 TABLET ORAL DAILY
Status: DISCONTINUED | OUTPATIENT
Start: 2019-04-24 | End: 2019-04-28 | Stop reason: HOSPADM

## 2019-04-24 RX ORDER — GLIPIZIDE 5 MG/1
10 TABLET ORAL
Status: DISCONTINUED | OUTPATIENT
Start: 2019-04-24 | End: 2019-04-28 | Stop reason: HOSPADM

## 2019-04-24 RX ORDER — DEXTROSE MONOHYDRATE 50 MG/ML
100 INJECTION, SOLUTION INTRAVENOUS PRN
Status: DISCONTINUED | OUTPATIENT
Start: 2019-04-24 | End: 2019-04-28 | Stop reason: HOSPADM

## 2019-04-24 RX ORDER — CLOPIDOGREL BISULFATE 75 MG/1
75 TABLET ORAL DAILY
Status: DISCONTINUED | OUTPATIENT
Start: 2019-04-24 | End: 2019-04-28 | Stop reason: HOSPADM

## 2019-04-24 RX ORDER — HEPARIN SODIUM 5000 [USP'U]/ML
4000 INJECTION, SOLUTION INTRAVENOUS; SUBCUTANEOUS ONCE
Status: COMPLETED | OUTPATIENT
Start: 2019-04-24 | End: 2019-04-24

## 2019-04-24 RX ORDER — HYDROXYZINE HYDROCHLORIDE 25 MG/1
25 TABLET, FILM COATED ORAL 2 TIMES DAILY
Status: DISCONTINUED | OUTPATIENT
Start: 2019-04-24 | End: 2019-04-28 | Stop reason: HOSPADM

## 2019-04-24 RX ORDER — HYDRALAZINE HYDROCHLORIDE 20 MG/ML
10 INJECTION INTRAMUSCULAR; INTRAVENOUS ONCE
Status: COMPLETED | OUTPATIENT
Start: 2019-04-24 | End: 2019-04-24

## 2019-04-24 RX ORDER — FUROSEMIDE 10 MG/ML
20 INJECTION INTRAMUSCULAR; INTRAVENOUS ONCE
Status: COMPLETED | OUTPATIENT
Start: 2019-04-24 | End: 2019-04-24

## 2019-04-24 RX ORDER — FUROSEMIDE 10 MG/ML
40 INJECTION INTRAMUSCULAR; INTRAVENOUS 2 TIMES DAILY
Status: DISCONTINUED | OUTPATIENT
Start: 2019-04-24 | End: 2019-04-25

## 2019-04-24 RX ORDER — DILTIAZEM HYDROCHLORIDE 5 MG/ML
10 INJECTION INTRAVENOUS ONCE
Status: COMPLETED | OUTPATIENT
Start: 2019-04-24 | End: 2019-04-24

## 2019-04-24 RX ADMIN — FUROSEMIDE 20 MG: 10 INJECTION, SOLUTION INTRAVENOUS at 11:10

## 2019-04-24 RX ADMIN — DILTIAZEM HYDROCHLORIDE 10 MG: 5 INJECTION INTRAVENOUS at 15:39

## 2019-04-24 RX ADMIN — METOPROLOL TARTRATE 25 MG: 25 TABLET ORAL at 17:36

## 2019-04-24 RX ADMIN — HEPARIN SODIUM 4000 UNITS: 5000 INJECTION INTRAVENOUS; SUBCUTANEOUS at 15:24

## 2019-04-24 RX ADMIN — HEPARIN SODIUM AND DEXTROSE 12 UNITS/KG/HR: 10000; 5 INJECTION INTRAVENOUS at 15:23

## 2019-04-24 RX ADMIN — FUROSEMIDE 40 MG: 10 INJECTION, SOLUTION INTRAMUSCULAR; INTRAVENOUS at 17:36

## 2019-04-24 RX ADMIN — NITROGLYCERIN 0.5 INCH: 20 OINTMENT TOPICAL at 11:12

## 2019-04-24 RX ADMIN — DILTIAZEM HYDROCHLORIDE 5 MG/HR: 5 INJECTION INTRAVENOUS at 15:39

## 2019-04-24 RX ADMIN — GLIPIZIDE 10 MG: 5 TABLET ORAL at 17:36

## 2019-04-24 RX ADMIN — HYDRALAZINE HYDROCHLORIDE 10 MG: 20 INJECTION INTRAMUSCULAR; INTRAVENOUS at 11:10

## 2019-04-24 RX ADMIN — INSULIN GLARGINE 12 UNITS: 100 INJECTION, SOLUTION SUBCUTANEOUS at 20:58

## 2019-04-24 RX ADMIN — SIMVASTATIN 20 MG: 20 TABLET, FILM COATED ORAL at 20:36

## 2019-04-24 RX ADMIN — HYDROXYZINE HYDROCHLORIDE 25 MG: 25 TABLET ORAL at 20:36

## 2019-04-24 RX ADMIN — METOPROLOL TARTRATE 25 MG: 25 TABLET ORAL at 20:36

## 2019-04-24 RX ADMIN — ASPIRIN 81 MG: 81 TABLET, COATED ORAL at 15:04

## 2019-04-24 RX ADMIN — CLOPIDOGREL BISULFATE 75 MG: 75 TABLET, FILM COATED ORAL at 17:36

## 2019-04-24 ASSESSMENT — PAIN DESCRIPTION - ORIENTATION
ORIENTATION: MID;UPPER;LEFT
ORIENTATION: MID;UPPER

## 2019-04-24 ASSESSMENT — PAIN DESCRIPTION - LOCATION
LOCATION: CHEST;NECK
LOCATION: BACK;CHEST

## 2019-04-24 ASSESSMENT — ENCOUNTER SYMPTOMS
WHEEZING: 1
WHEEZING: 0
ALLERGIC/IMMUNOLOGIC NEGATIVE: 1
CONSTIPATION: 0
CHOKING: 1
TROUBLE SWALLOWING: 0
SHORTNESS OF BREATH: 1
ABDOMINAL PAIN: 0
STRIDOR: 0
VOMITING: 0
EYES NEGATIVE: 1
COUGH: 1
NAUSEA: 0
RHINORRHEA: 0

## 2019-04-24 ASSESSMENT — PAIN DESCRIPTION - FREQUENCY: FREQUENCY: INTERMITTENT

## 2019-04-24 ASSESSMENT — PAIN DESCRIPTION - PAIN TYPE
TYPE: ACUTE PAIN
TYPE: ACUTE PAIN

## 2019-04-24 ASSESSMENT — PAIN SCALES - GENERAL
PAINLEVEL_OUTOF10: 8
PAINLEVEL_OUTOF10: 0
PAINLEVEL_OUTOF10: 2

## 2019-04-24 ASSESSMENT — PAIN DESCRIPTION - DESCRIPTORS: DESCRIPTORS: DISCOMFORT;TIGHTNESS

## 2019-04-24 ASSESSMENT — PAIN DESCRIPTION - ONSET: ONSET: PROGRESSIVE

## 2019-04-24 NOTE — PROGRESS NOTES
Report called to [employer]. Waiting for transport. Electronically signed by Breana Noriega RN on 4/24/2019 at 2:19 PM  Post void residual bladder scan 54p per KATHE Cornejo.

## 2019-04-24 NOTE — CONSULTS
[facility] HEART CARDIOLOGY CONSULTATION NOTE    PATIENT NAME: Fallon Bravo  PATIENT MRN: [id number]  SERVICE DATE:  4/24/2019  SERVICE TIME: 3:12 PM    PRIMARY CARE PHYSICIAN: Gabbie Hong  CONSULTING CARDIOLOGIST : Norberto Davis MD [facility]  PRIMARY CARDIOLOGIST: Sol Escalera MD [facility]  ================================================================    REASON FOR CONSULTATION:    Chest burning,SOB,Afib with RVR   HPI:   Fallon Bravo is a 75 y.o. male who presented with progressively worsening shortness of breath retrosternal burning sensation radiating to the neck bouts of palpitations orthopnea, difficulty sleeping, frequently sitting at the edge of the bed at night, was admitted to [facility], I got a call from Dr. Meg Saunders who evaluated patient, and felt that patient needed cardiology attention because of abnormal EKG, atrial fibrillation and clinical status. Patient was transferred to [address] [place] [address].    Patient denies any bleeding diathesis does not know that she has ever had any irregular heartbeats, does have a history of stenting of his lower extremities, no coronary stents, additional review of information suggests that patient has recently been treated for upper respiratory symptoms , and also received prednisone taper  and  has chronic kidney disease stage IV. CARDIAC HISTORY:Per [address] notes by Obey Zimmer this week OV    He has a history of atherosclerotic heart disease with angioplasty and stenting procedure in 2006. 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 Myocardial perfusion study in October 2015 was negative for ischemia. LV ejection active 60% with mild inferior hypokinesis. He has mild mitral and tricuspid regurgitation noted on previous echocardiogram. He has peripheral vascular disease with previous bilateral angioplasty and stenting procedures.  He has moderate bilateral carotid artery stenosis. He has essential hypertension, hyperlipidemia, type 2 diabetes mellitus, and persistent tobacco abuse. He developed paroxysmal atrial fibrillation in May 2017 and converted spontaneously to normal sinus rhythm. He is chronically anticoagulated with Xarelto. He has documented frequent a symptomatic PVCs. He has chronic kidney disease. He developed some worsening claudication and underwent aortogram with runoffs on July 2, 2018. There was 85-90% stenosis of the right external iliac artery, 70% stenosis of the right superficial femoral artery, severe disease of the right anterior tibialis artery, and patent stent in the proximal left common iliac with severe 80% stenosis just distal to that. There was total occlusion of the left internal iliac artery. There was up to 80% stenosis of the left superficial femoral artery. He underwent angioplasty stenting of the right external iliac artery. He had a baseline creatinine of 2.5 so he returned on July 16, 2018 and underwent antiplasty and stenting of the left common iliac artery. His baseline creatinine was 2.5. Subsequent creatinine was 2.69. He had follow-up labs in October 29, 2018 and was noted to have sodium 139, potassium 5.2, blood urea nitrogen 58, and creatinine 3.32. GFR was 18. He is seeing Dr. Sixto Scott on a regular basis. He was told his renal function is gradually declining. He is currently on Eliquis . PAST MEDICAL HISTORY:    Past Medical History:   Diagnosis Date    Diabetes mellitus ([address]     Hyperlipidemia     Hypertension        PAST SURGICAL HISTORY: Patient has not had coronary stents placed, he has had peripheral vascular intervention by Dr. Richard Cabrera. Past Surgical History:   Procedure Laterality Date    CARDIAC CATHETERIZATION      WITH 3 STENTS PLACED       FAMILY HISTORY:  History reviewed. No pertinent family history.     SOCIAL HISTORY:    Social History     Socioeconomic History    Marital status:      Spouse name: Not on file    Number of children: Not on file    Years of education: Not on file    Highest education level: Not on file   Occupational History    Not on file   Social Needs    Financial resource strain: Not on file    Food insecurity:     Worry: Not on file     Inability: Not on file    Transportation needs:     Medical: Not on file     Non-medical: Not on file   Tobacco Use    Smoking status: Current Every Day Smoker     Packs/day: 0.50     Years: 50.00     Pack years: 25.00     Types: Cigarettes    Smokeless tobacco: Never Used   Substance and Sexual Activity    Alcohol use: No    Drug use: No    Sexual activity: Not Currently   Lifestyle    Physical activity:     Days per week: Not on file     Minutes per session: Not on file    Stress: Not on file   Relationships    Social connections:     Talks on phone: Not on file     Gets together: Not on file     Attends Islam service: Not on file     Active member of club or organization: Not on file     Attends meetings of clubs or organizations: Not on file     Relationship status: Not on file    Intimate partner violence:     Fear of current or ex partner: Not on file     Emotionally abused: Not on file     Physically abused: Not on file     Forced sexual activity: Not on file   Other Topics Concern    Not on file   Social History Narrative    Not on file       MEDICATIONS:  Current Facility-Administered Medications   Medication Dose Route Frequency Provider Last Rate Last Dose    aspirin EC tablet 81 mg  81 mg Oral Daily Hamilton Lin MD   81 mg at 04/24/19 1504    clopidogrel (PLAVIX) tablet 75 mg  75 mg Oral Daily Hamilton Lin MD        glipiZIDE (GLUCOTROL) tablet 10 mg  10 mg Oral BID AC Hamilton Lin MD        hydrOXYzine (ATARAX) tablet 25 mg  25 mg Oral BID Hamilton Lin MD        insulin glargine (LANTUS) injection vial 12 Units  12 Units Subcutaneous Nightly Hamilton Lin MD        levothyroxine (SYNTHROID) tablet 200 mcg  200 mcg Oral Daily Tami Hernandez MD        metoprolol tartrate (LOPRESSOR) tablet 25 mg  25 mg Oral BID Tami Hernandez MD        therapeutic multivitamin-minerals 1 tablet  1 tablet Oral Daily Tami Hernandez MD        simvastatin (ZOCOR) tablet 20 mg  20 mg Oral Nightly Tami Hernandez MD        glucose (GLUTOSE) 40 % oral gel 15 g  15 g Oral PRN Tami Hernandez MD        dextrose 50 % solution 12.5 g  12.5 g Intravenous PRN Tami Hernandez MD        glucagon (rDNA) injection 1 mg  1 mg Intramuscular PRN Tami Hernandez MD        dextrose 5 % solution  100 mL/hr Intravenous PRN Tami Hernandez MD        insulin lispro (HUMALOG) injection vial 0-6 Units  0-6 Units Subcutaneous TID WC Tami Hernandez MD        insulin lispro (HUMALOG) injection vial 0-3 Units  0-3 Units Subcutaneous Nightly Tami Hernandez MD        diltiazem injection 10 mg  10 mg Intravenous Once Tami Hernandez MD        diltiazem 125 mg in dextrose 5 % 125 mL infusion  5 mg/hr Intravenous Continuous Tami Hernandez MD        furosemide (LASIX) injection 40 mg  40 mg Intravenous BID Tami Hernandez MD        heparin (porcine) injection 4,000 Units  4,000 Units Intravenous Once Tami Hernandez MD   Stopped at 04/24/19 1454    heparin (porcine) injection 4,000 Units  4,000 Units Intravenous Once Mattie Aguilar MD        heparin 25,000 units in dextrose 5% 250 mL infusion  1,000 Units/hr Intravenous Continuous Mattie Aguilar MD        glucose (GLUTOSE) 40 % oral gel 15 g  15 g Oral PRN Sagee Pounds, DO        dextrose 50 % solution 12.5 g  12.5 g Intravenous PRN Sagee Pounds, DO        glucagon (rDNA) injection 1 mg  1 mg Intramuscular PRN Georgianne Pounds, DO        dextrose 5 % solution  100 mL/hr Intravenous PRN Sagee Pounds, DO             ALLERGIES AND DRUG INTOLERANCES:   Allergies   Allergen Reactions    Lisinopril      Other reaction(s): COUGH       ROS:   Review of Systems Standard (2 Vw)    Result Date: 4/18/2019  EXAMINATION: XR CHEST (2 VW) CLINICAL HISTORY: R06.2 Wheezing on expiration ICD10 COMPARISONS: Rib radiographs 1/27/2009 FINDINGS: Cardiac size and pulmonary vascularity are normal. There are radiographic findings suggestive of COPD. There is blunting of the costophrenic angles consistent with small effusions. There is mild elevation of the right hemidiaphragm. There is no  definite airspace or interstitial edema. There are no consolidations. There is no evidence of adenopathy. There is mild thoracic kyphosis. Bones are demineralized. There is mild spondylosis. There is slight wedge compression of the T9 vertebra, of indeterminate age. There is partially visualized circumscribed sclerosis in the proximal diaphysis of the right humerus, consistent with old enchondroma or bone infarction. COPD. SMALL PLEURAL EFFUSIONS. RADIOGRAPHIC FINDINGS COULD REPRESENT MILD CONGESTIVE HEART FAILURE SUPERIMPOSED ON COPD. Xr Chest Portable    Result Date: 4/24/2019  EXAMINATION: CHEST PORTABLE VIEW  CLINICAL HISTORY: Short of breath COMPARISONS: April 18, 2018  FINDINGS: Single  views of the chest is submitted. The cardiac silhouette is enlarged. The mediastinum is unremarkable. Pulmonary vascular is congested with increased interstitial markings. Right sided trachea. Superimposed bibasilar groundglass infiltrates. No Pneumothoraces. Old bone infarct proximal right humerus. RADIOGRAPHIC FINDINGS OF CHF WITH SUPERIMPOSED BIBASILAR GROUNDGLASS INFILTRATES.        LABS:  Recent Labs     04/24/19  0945   WBC 11.6*   HCT 36.2*      INR 1.0      K 4.5   CO2 25   BUN 69*     CBC:   Recent Labs     04/24/19  0945   WBC 11.6*   HGB 12.0*   HCT 36.2*        BMP:  Recent Labs     04/24/19  0945      K 4.5      CO2 25   BUN 69*   CREATININE 3.43*   LABGLOM 17.6*     ABGs: No results found for: PH, PO2, PCO2  INR:   Recent Labs     04/24/19  0945   INR 1.0     PRO-BNP:   Lab Results   Component Value Date    PROBNP 39,586 04/24/2019      TSH: No results found for: TSH   Cardiac Injury Profile:   Recent Labs     04/24/19  0945   TROPONINI 0.514*      Lipid Profile: No results found for: TRIG, HDL, LDLCALC, CHOL   Hemoglobin A1C: No components found for: HGBA1C     No intake or output data in the 24 hours ending 04/24/19 1512     Bedside echo to my review shows preserved left ventricular ejection fraction of at least 50% cannot exclude apical hypokinesis within the limits of the study. Left atrium 5.3 cm PA pressure about 30 mmHg 1+ tricuspid and mitral regurgitation. Tricuspid aortic valve with aortic sclerosis. No pericardial effusion. IMPRESSIONS:  1. Class IV left ventricular heart failure with preserved LV systolic function, congestive heart failure, subacute onset symptoms of one week duration. 2. Angina pectoris, probable non-ST segment elevation myocardial infarction. Mechanism unclear, could be either related to rapid rate and supply demand mismatch or related to plaque rupture. 3. Chronic kidney disease stage IV  4. Paroxysmal atrial fibrillation with rapid ventricular rate  5. Extensive vascular disease to include cerebrovascular and peripheral vascular disease, high pretest probability of coronary atherosclerosis as well. 6. Retrosternal burning sensation-assess his anginal equivalent. 7. Renal function complicates management issues and testing. 8. High risk medication-eliquis  9. Chads 2 Vasc  score of at least 4      RECOMMENDATIONS:  1. Imperative to sustain sinus rhythm. Patient has converted spontaneously. 2. We will add low-dose amiodarone  3. Diuretics to be addressed by nephrology. 4. Echocardiogram revealed  5.  Favor cardiac catheterization and possible intervention, I have briefly discussed procedure risks benefits and alternatives with him, I would like nephrology input as well, may be urgent if clinical situation deteriorates. Right now we can wait. 6. See orders.          ================================================================      Thank you for your consideration for this consultation.     SIGNATURE: Electronically signed by Camille Delgado MD on 4/24/2019 at 3:12 PM

## 2019-04-24 NOTE — FLOWSHEET NOTE
Received report from [facility], patient transferred to [facility] from [facility]. Patient is alert and oriented, denies pain or shortness of breath. Vital signs obtained. Dr. Александр Montero is at the bedside ordered heparin and cardizem gtt.  in room drawing labs.

## 2019-04-24 NOTE — H&P
Department of Internal Medicine  Nephrology  [facility]  Attending History and Physical      CHIEF COMPLAINT:  Shortness of breath    Reason for Admission:  Chf, afib, worse gfr    History Obtained From:  patient    HISTORY OF PRESENT ILLNESS:    75y.o. year old male with history s/f CKD stage 4 followed by Dr. Herberth Richard with last office visit Oct 2018. Sees [facility] and primary care between [place] as well as [place] [facility]acock NP's. Has DM, HTN, PVD. No history of CHF or afib. Not on any diuretics. Admitted with increased SOB. Saw primary care last week. Was given steroids and nebs for SOB. B/l cr in 2's. Now in 3's. Is in rapid afib. Has been having heart burn for weeks. Has been having increased orthopnea. Has severe peripheral stents but no cardiac ones. Stopped smoking a little while ago. Swelling in legs is new.       Past Medical History:        Diagnosis Date    Diabetes mellitus ([address]     Hyperlipidemia     Hypertension        Past Surgical History:        Procedure Laterality Date    CARDIAC CATHETERIZATION      WITH 3 STENTS PLACED       Medications Prior to Admission:    Medications Prior to Admission: sodium chloride (OCEAN) 0.65 % nasal spray, 2 sprays by Nasal route as needed for Congestion  losartan (COZAAR) 50 MG tablet, Take 0.5 tablets by mouth daily  aspirin 81 MG EC tablet, Take 81 mg by mouth daily  clopidogrel (PLAVIX) 75 MG tablet, Take 75 mg by mouth daily  glipiZIDE (GLUCOTROL) 10 MG tablet, Take 10 mg by mouth 2 times daily (before meals)  hydrOXYzine (ATARAX) 25 MG tablet, Take 25 mg by mouth 2 times daily  insulin glargine (LANTUS) 100 UNIT/ML injection vial, Inject 16 Units into the skin nightly Took 12 units bedtime last night   levothyroxine (SYNTHROID) 200 MCG tablet, Take 200 mcg by mouth Daily  metoprolol tartrate (LOPRESSOR) 25 MG tablet, Take 25 mg by mouth 2 times daily  Multiple Vitamins-Minerals (THERAPEUTIC MULTIVITAMIN-MINERALS) tablet, Take 1 tablet by mouth daily  simvastatin (ZOCOR) 20 MG tablet, Take 20 mg by mouth nightly  Spacer/Aero Chamber Mouthpiece MISC, 1 each by Does not apply route once as needed (with inhaler)  nitroGLYCERIN (NITROSTAT) 0.4 MG SL tablet, Place 0.4 mg under the tongue every 5 minutes as needed for Chest pain up to max of 3 total doses. If no relief after 1 dose, call 911. Allergies:  Aripiprazole    Social History:   Lives at home with wife    Family History:   History reviewed. No pertinent family history.     REVIEW OF SYSTEMS:  Positives in bold  Constitutional: fever, chills, fatigue, malaise   HENT:  rhinorrhea, sinus pain, sore throat, epistaxis    Eyes:  photophobia, visual disturbance, eye redness  Respiratory: shortness of breath, cough, hemoptysis    Cardiovascular: chest pain, palpitations, orthopnea, leg swelling   Gastrointestinal: abdominal pain, nausea, vomiting, diarrhea, constipation   Endocrine: polydipsia, polyphagia, cold intolerance, heat intolerance  Genitourinary: dysuria, flank pain, frequency, urgency   Hematologic: easy bruising, easy bleeding  Musculoskeletal: back pain, neck pain, myalgias, arthalgias  Neurological: syncope, lightheadedness, dizziness, seizures, tremors, weakness  Psychiatric/Behavioral: anxiety, depression, hallucinations  Skin: rash, itching    PHYSICAL EXAM:  Vitals:  BP (!) 143/94   Pulse 77   Temp 97.1 °F (36.2 °C) (Oral)   Resp 18   Ht 5' 10\" (1.778 m)   Wt 210 lb (95.3 kg)   SpO2 97%   BMI 30.13 kg/m²     General: alert, in no apparent distress  HEENT: normocephalic, atraumatic, anicteric  Neck: supple, no mass  Lungs: crackles  Heart: irregular, tachycardic  Abdomen: soft, non-tender, non-distended  MSK: no joint swelling or tenderness  Ext: 1+ edema  Neuro: alert and oriented, no gross abnormalities  Psych: normal mood and affect  Skin: no rash    DATA:  CBC with Differential:    Lab Results   Component Value Date    WBC 11.6 04/24/2019    RBC 4.24 04/24/2019    HGB 12.0

## 2019-04-24 NOTE — PROGRESS NOTES
Pt arrived to 4WT approximately 1250. Vital signs and POCT glucose obtained. Alert and oriented x4. Monitor room called to report Pt converted into A fib with Heart rate 120-130s. Pt c/o pain on left side of neck and in upper mid chest 8/10. Notified Dr Cayetano Guevara was on floor. Order to obtain EKG received.

## 2019-04-24 NOTE — ED TRIAGE NOTES
Pt c/o feeling sob. States it is worse at night. Was recently diagnosed with bronchitis and patient states he is getting worse.   Unable to get into a pulmonologist. Type Of Previous Surgery (Optional- Ie Mohs Surgery): nasolabial flap reconstruction

## 2019-04-24 NOTE — ED PROVIDER NOTES
[address] [facility] ED  eMERGENCY dEPARTMENT eNCOUnter      Pt Name: Juarez Arcos  MRN: [id number]  [place] 1945  Date of evaluation: 4/24/2019  Provider: Clinton Lyle MD    CHIEF COMPLAINT       Chief Complaint   Patient presents with    Shortness of Breath         HISTORY OF PRESENT ILLNESS   (Location/Symptom, Timing/Onset,Context/Setting, Quality, Duration, Modifying Factors, Severity)  Note limiting factors. Juarez Arcos is a 75 y.o. male who presents to the emergency department with complaint of increasing shortness of breath. Patient complains he feels short of breath with supine position. Patient is been going on for several days. Patient admits he was unable to lay down at all last night. Patient as she has to stay stay sitting up. Patient as he follows Dr. Ajay Montana for renal issues and [facility] for cardiology issues. Patient denies [place] chest pain, nausea, vomiting or fevers or chills. Patient as she was recently see his PCP and diagnosed with bronchitis. Patient MrsElaine phases antibiotics he is not improved. HPI    NursingNotes were reviewed. REVIEW OF SYSTEMS    (2-9 systems for level 4, 10 or more for level 5)     Review of Systems   Constitutional: Positive for activity change, appetite change and fatigue. Negative for chills and fever. HENT: Negative for congestion, ear pain, rhinorrhea and trouble swallowing. Eyes: Negative. Respiratory: Positive for cough, choking and shortness of breath. Negative for wheezing and stridor. Cardiovascular: Positive for leg swelling. Negative for chest pain. Gastrointestinal: Negative for abdominal pain, nausea and vomiting. Endocrine: Negative. Genitourinary: Negative for dysuria and frequency. Musculoskeletal: Negative for gait problem and neck pain. Skin: Negative. Allergic/Immunologic: Negative. Neurological: Negative. Hematological: Negative. Psychiatric/Behavioral: Negative.         Except as needed (with inhaler)       ALLERGIES     Lisinopril    FAMILY HISTORY     History reviewed. No pertinent family history. SOCIAL HISTORY       Social History     Socioeconomic History    Marital status:      Spouse name: None    Number of children: None    Years of education: None    Highest education level: None   Occupational History    None   Social Needs    Financial resource strain: None    Food insecurity:     Worry: None     Inability: None    Transportation needs:     Medical: None     Non-medical: None   Tobacco Use    Smoking status: Current Every Day Smoker     Packs/day: 0.50     Years: 50.00     Pack years: 25.00     Types: Cigarettes    Smokeless tobacco: Never Used   Substance and Sexual Activity    Alcohol use: No    Drug use: No    Sexual activity: Not Currently   Lifestyle    Physical activity:     Days per week: None     Minutes per session: None    Stress: None   Relationships    Social connections:     Talks on phone: None     Gets together: None     Attends [employer] service: None     Active member of club or organization: None     Attends meetings of clubs or organizations: None     Relationship status: None    Intimate partner violence:     Fear of current or ex partner: None     Emotionally abused: None     Physically abused: None     Forced sexual activity: None   Other Topics Concern    None   Social History Narrative    None       SCREENINGS             PHYSICAL EXAM    (up to 7 for level 4, 8 or more for level 5)     ED Triage Vitals   BP Temp Temp Source Pulse Resp SpO2 Height Weight   04/24/19 1057 04/24/19 0857 04/24/19 0857 04/24/19 0857 04/24/19 0857 04/24/19 0857 04/24/19 0857 04/24/19 0857   (!) 205/89 97.1 °F (36.2 °C) Oral 75 20 96 % 5' 10\" (1.778 m) 210 lb (95.3 kg)       Physical Exam   Constitutional: He is oriented to person, place, and time. He appears well-developed and well-nourished. No distress.    HENT:   Head: Normocephalic and modest guarding or megaly. There is evidence of cephalization and developing congestive heart failure. There is evidence of right-sided pleural effusion. Interpretation per the Radiologist below, if available at the time ofthis note:    XR CHEST PORTABLE    (Results Pending)         ED BEDSIDE ULTRASOUND:   Performed by ED Physician - none    LABS:  Labs Reviewed   COMPREHENSIVE METABOLIC PANEL - Abnormal; Notable for the following components:       Result Value    Glucose 147 (*)     BUN 69 (*)     CREATININE 3.43 (*)     GFR Non- 17.6 (*)     GFR  21.3 (*)     Calcium 8.2 (*)     Total Protein 6.1 (*)     Alb 3.0 (*)     All other components within normal limits   CBC WITH AUTO DIFFERENTIAL - Abnormal; Notable for the following components:    WBC 11.6 (*)     RBC 4.24 (*)     Hemoglobin 12.0 (*)     Hematocrit 36.2 (*)     Neutrophils # 8.8 (*)     Monocytes # 1.0 (*)     All other components within normal limits   TROPONIN - Abnormal; Notable for the following components:    Troponin 0.514 (*)     All other components within normal limits    Narrative:     Nestor De La Garza tel. [phone],  TROP results called to and read back by Stephenie Mena RN, 04/24/2019 10:28, by  [id number] #1   CULTURE BLOOD #2   LACTIC ACID, PLASMA   BRAIN NATRIURETIC PEPTIDE    Narrative:     Nestor De La Garza tel. [phone],  TROP results called to and read back by Stephenie Mena RN, 04/24/2019 10:28, by  Aniket Dolan   APTT   PROTIME-INR       All other labs were within normal range or not returned as of this dictation. EMERGENCY DEPARTMENT COURSE and DIFFERENTIAL DIAGNOSIS/MDM:   Vitals:    Vitals:    04/24/19 0857 04/24/19 1057   BP:  (!) 205/89   Pulse: 75 68   Resp: 20 18   Temp: 97.1 °F (36.2 °C)    TempSrc: Oral    SpO2: 96% 97%   Weight: 210 lb (95.3 kg)    Height: 5' 10\" (1.778 m)        Patient demonstrates hypertensive urgency.   Patient demonstrates evidence of acute congestive heart failure. Patient demonstrates evidence of acute kidney injury. Medications noted including diuretics, antihypertensives, and vasodilatation with nitrates. Will admit patient to the hospital further evaluation management. MDM    CRITICAL CARE TIME   Total Critical Care time was - minutes, excluding separately reportableprocedures. There was a high probability of clinicallysignificant/life threatening deterioration in the patient's condition which required my urgent intervention.  -    CONSULTS:  None    PROCEDURES:  Unless otherwise noted below, none     Procedures    FINAL IMPRESSION      1. Congestive heart failure, unspecified HF chronicity, unspecified heart failure type ([facility] [address]    2. Acute kidney injury [facility])        DISPOSITION/PLAN   DISPOSITION Decision To Admit 04/24/2019 11:06:33 AM      PATIENT REFERRED TO:  No follow-up provider specified.     DISCHARGE MEDICATIONS:  New Prescriptions    No medications on file          (Please note that portions of this note were completed with a voice recognitionprogram.  Efforts were made to edit the dictations but occasionally words are mis-transcribed.)    Sigifredo Rodriguez MD (electronically signed)  Attending Emergency Physician          Sigifredo Rodriguez MD  04/24/19 9601

## 2019-04-24 NOTE — PROGRESS NOTES
Bladder scan done per order just prior to pt leaving floor to [address] void residual noted. [employer] to Dr Jesus Williamson. Dr Jesus Williamson called after pt transfer. Order received to insert armenta catheter.   Electronically signed by Georgia Ortega RN on 4/24/2019 at 3:10 PM

## 2019-04-24 NOTE — ED NOTES
Pt denies any complaints at this time. Pt skin warm and dry.  Pt states he is more comfortable sitting on edge of bed     Annika Red RN  04/24/19 9789

## 2019-04-24 NOTE — CARE COORDINATION
SPOKE WITH PATIENT- PATIENT STATES HE LIVES AT HOME WITH HIS SPOUSE- STATES HE USES NO DEVICES AT  [place] [address].   DENIES ANY NEEDS AT HOME AT THIS TIME    Personalized Discharge Plan of Care for:  Nelida Martinez, 1945    Based on our assessment, your plan of care includes:    DX CHF    FROM HOME WITH SP     Patient has all needed scripts- UPON D/C      Patient has all required DME- UPON D/C     Patient has a reliable scale and is able to read it- HAS     Patient has home oxygen set up if ordered- DENIES HAVING     Patient has nebulizer and associated equipment if ordered- DENIES     Patient has been assessed for medication affordability- N/A     Consult with our Medication Assistance Program- N/A     Patient has been assessed for any transportation barriers- N/A     Nutrition consult for CHF- WILL ORDER    Respiratory therapy consult that includes bedside instructions on administration of nebulizers and/or inhalers, and assessment of oxygen and equipment needs in the home- ON     Consult with a Pulmonologist- IF ORDERED     Consult with a Cardiologist- ON     Cardiac Rehab referral if EF <35%- IF ORDERED     Consult with Pharmacy for medication assessment prior to discharge- IF ORDERED      Consult with Ila Ny to aid in depression, anxiety, or coping issues     Consult with our Transitional Care Nurses for education and follow up- [address] with [place] with our [address] [place] [address] Avenue- IF ORDERED     Pulmonary Rehab Order for COPD, PN and CHF (if EF>35%)     Date of follow-up appointment with pulmonologist and/or PCP within 7 days of  Discharge     Date of follow-up appointment with cardiologist within 4 days for CHF and PCP  within 10 days of discharge        Electronically by Ryan De Jesus RN  on 4/24/2019 at 4:17 PM

## 2019-04-25 ENCOUNTER — APPOINTMENT (OUTPATIENT)
Dept: GENERAL RADIOLOGY | Age: 74
DRG: 280 | End: 2019-04-25
Payer: MEDICARE

## 2019-04-25 LAB
ANION GAP SERPL CALCULATED.3IONS-SCNC: 14 MEQ/L (ref 9–15)
APTT: 37.6 SEC (ref 21.6–35.4)
APTT: 38.7 SEC (ref 21.6–35.4)
APTT: 45.9 SEC (ref 21.6–35.4)
BASOPHILS ABSOLUTE: 0.1 K/UL (ref 0–0.2)
BASOPHILS RELATIVE PERCENT: 0.5 %
BUN BLDV-MCNC: 72 MG/DL (ref 8–23)
CALCIUM SERPL-MCNC: 7.9 MG/DL (ref 8.5–9.9)
CHLORIDE BLD-SCNC: 103 MEQ/L (ref 95–107)
CO2: 21 MEQ/L (ref 20–31)
CREAT SERPL-MCNC: 3.6 MG/DL (ref 0.7–1.2)
EOSINOPHILS ABSOLUTE: 0.6 K/UL (ref 0–0.7)
EOSINOPHILS RELATIVE PERCENT: 5.9 %
GFR AFRICAN AMERICAN: 20.1
GFR NON-AFRICAN AMERICAN: 16.6
GLUCOSE BLD-MCNC: 162 MG/DL (ref 70–99)
GLUCOSE BLD-MCNC: 194 MG/DL (ref 60–115)
GLUCOSE BLD-MCNC: 201 MG/DL (ref 60–115)
GLUCOSE BLD-MCNC: 348 MG/DL (ref 60–115)
GLUCOSE BLD-MCNC: 351 MG/DL (ref 60–115)
HCT VFR BLD CALC: 33.2 % (ref 42–52)
HEMOGLOBIN: 11.2 G/DL (ref 14–18)
LYMPHOCYTES ABSOLUTE: 1.1 K/UL (ref 1–4.8)
LYMPHOCYTES RELATIVE PERCENT: 10.3 %
MCH RBC QN AUTO: 29.5 PG (ref 27–31.3)
MCHC RBC AUTO-ENTMCNC: 33.8 % (ref 33–37)
MCV RBC AUTO: 87.2 FL (ref 80–100)
MONOCYTES ABSOLUTE: 1 K/UL (ref 0.2–0.8)
MONOCYTES RELATIVE PERCENT: 9.6 %
NEUTROPHILS ABSOLUTE: 8.1 K/UL (ref 1.4–6.5)
NEUTROPHILS RELATIVE PERCENT: 73.7 %
PDW BLD-RTO: 14 % (ref 11.5–14.5)
PERFORMED ON: ABNORMAL
PLATELET # BLD: 246 K/UL (ref 130–400)
POTASSIUM SERPL-SCNC: 4.3 MEQ/L (ref 3.4–4.9)
RBC # BLD: 3.8 M/UL (ref 4.7–6.1)
SODIUM BLD-SCNC: 138 MEQ/L (ref 135–144)
TROPONIN: 0.45 NG/ML (ref 0–0.01)
WBC # BLD: 11 K/UL (ref 4.8–10.8)

## 2019-04-25 PROCEDURE — 6370000000 HC RX 637 (ALT 250 FOR IP): Performed by: INTERNAL MEDICINE

## 2019-04-25 PROCEDURE — 36415 COLL VENOUS BLD VENIPUNCTURE: CPT

## 2019-04-25 PROCEDURE — 71046 X-RAY EXAM CHEST 2 VIEWS: CPT

## 2019-04-25 PROCEDURE — 2060000000 HC ICU INTERMEDIATE R&B

## 2019-04-25 PROCEDURE — 85025 COMPLETE CBC W/AUTO DIFF WBC: CPT

## 2019-04-25 PROCEDURE — 80048 BASIC METABOLIC PNL TOTAL CA: CPT

## 2019-04-25 PROCEDURE — 85730 THROMBOPLASTIN TIME PARTIAL: CPT

## 2019-04-25 PROCEDURE — 93005 ELECTROCARDIOGRAM TRACING: CPT

## 2019-04-25 PROCEDURE — 6360000002 HC RX W HCPCS: Performed by: INTERNAL MEDICINE

## 2019-04-25 PROCEDURE — 51702 INSERT TEMP BLADDER CATH: CPT

## 2019-04-25 PROCEDURE — 2580000003 HC RX 258: Performed by: INTERNAL MEDICINE

## 2019-04-25 RX ORDER — SODIUM CHLORIDE 0.9 % (FLUSH) 0.9 %
10 SYRINGE (ML) INJECTION PRN
Status: DISCONTINUED | OUTPATIENT
Start: 2019-04-25 | End: 2019-04-28 | Stop reason: HOSPADM

## 2019-04-25 RX ORDER — AMIODARONE HYDROCHLORIDE 200 MG/1
200 TABLET ORAL 2 TIMES DAILY
Status: DISCONTINUED | OUTPATIENT
Start: 2019-04-25 | End: 2019-04-28 | Stop reason: HOSPADM

## 2019-04-25 RX ORDER — DIPHENHYDRAMINE HCL 25 MG
50 TABLET ORAL ONCE
Status: DISCONTINUED | OUTPATIENT
Start: 2019-04-25 | End: 2019-04-28 | Stop reason: HOSPADM

## 2019-04-25 RX ORDER — PREDNISONE 50 MG/1
50 TABLET ORAL ONCE
Status: DISCONTINUED | OUTPATIENT
Start: 2019-04-25 | End: 2019-04-26

## 2019-04-25 RX ORDER — SODIUM CHLORIDE 9 MG/ML
INJECTION, SOLUTION INTRAVENOUS CONTINUOUS
Status: DISCONTINUED | OUTPATIENT
Start: 2019-04-25 | End: 2019-04-27

## 2019-04-25 RX ORDER — NITROGLYCERIN 0.4 MG/1
0.4 TABLET SUBLINGUAL EVERY 5 MIN PRN
Status: DISCONTINUED | OUTPATIENT
Start: 2019-04-25 | End: 2019-04-28 | Stop reason: HOSPADM

## 2019-04-25 RX ORDER — SODIUM CHLORIDE 0.9 % (FLUSH) 0.9 %
10 SYRINGE (ML) INJECTION EVERY 12 HOURS SCHEDULED
Status: DISCONTINUED | OUTPATIENT
Start: 2019-04-25 | End: 2019-04-28 | Stop reason: HOSPADM

## 2019-04-25 RX ORDER — ONDANSETRON 2 MG/ML
4 INJECTION INTRAMUSCULAR; INTRAVENOUS EVERY 6 HOURS PRN
Status: DISCONTINUED | OUTPATIENT
Start: 2019-04-25 | End: 2019-04-28 | Stop reason: HOSPADM

## 2019-04-25 RX ORDER — DIAZEPAM 5 MG/1
5 TABLET ORAL
Status: DISCONTINUED | OUTPATIENT
Start: 2019-04-25 | End: 2019-04-28 | Stop reason: HOSPADM

## 2019-04-25 RX ADMIN — DILTIAZEM HYDROCHLORIDE 30 MG: 30 TABLET, FILM COATED ORAL at 01:09

## 2019-04-25 RX ADMIN — GLIPIZIDE 10 MG: 5 TABLET ORAL at 16:23

## 2019-04-25 RX ADMIN — LEVOTHYROXINE SODIUM 200 MCG: 0.2 TABLET ORAL at 06:30

## 2019-04-25 RX ADMIN — METOPROLOL TARTRATE 25 MG: 25 TABLET ORAL at 08:21

## 2019-04-25 RX ADMIN — DILTIAZEM HYDROCHLORIDE 30 MG: 30 TABLET, FILM COATED ORAL at 12:17

## 2019-04-25 RX ADMIN — METOPROLOL TARTRATE 25 MG: 25 TABLET ORAL at 20:25

## 2019-04-25 RX ADMIN — AMIODARONE HYDROCHLORIDE 200 MG: 200 TABLET ORAL at 20:25

## 2019-04-25 RX ADMIN — HYDROXYZINE HYDROCHLORIDE 25 MG: 25 TABLET ORAL at 08:21

## 2019-04-25 RX ADMIN — HYDROXYZINE HYDROCHLORIDE 25 MG: 25 TABLET ORAL at 20:25

## 2019-04-25 RX ADMIN — HEPARIN SODIUM AND DEXTROSE 13.3 ML/HR: 10000; 5 INJECTION INTRAVENOUS at 13:04

## 2019-04-25 RX ADMIN — MULTIPLE VITAMINS W/ MINERALS TAB 1 TABLET: TAB at 08:21

## 2019-04-25 RX ADMIN — CLOPIDOGREL BISULFATE 75 MG: 75 TABLET, FILM COATED ORAL at 08:21

## 2019-04-25 RX ADMIN — INSULIN LISPRO 2 UNITS: 100 INJECTION, SOLUTION INTRAVENOUS; SUBCUTANEOUS at 16:23

## 2019-04-25 RX ADMIN — INSULIN LISPRO 2 UNITS: 100 INJECTION, SOLUTION INTRAVENOUS; SUBCUTANEOUS at 08:27

## 2019-04-25 RX ADMIN — ASPIRIN 81 MG: 81 TABLET, COATED ORAL at 08:21

## 2019-04-25 RX ADMIN — INSULIN GLARGINE 12 UNITS: 100 INJECTION, SOLUTION SUBCUTANEOUS at 21:26

## 2019-04-25 RX ADMIN — AMIODARONE HYDROCHLORIDE 200 MG: 200 TABLET ORAL at 13:24

## 2019-04-25 RX ADMIN — FUROSEMIDE 5 MG/HR: 10 INJECTION, SOLUTION INTRAVENOUS at 13:34

## 2019-04-25 RX ADMIN — DILTIAZEM HYDROCHLORIDE 30 MG: 30 TABLET, FILM COATED ORAL at 06:30

## 2019-04-25 RX ADMIN — PANTOPRAZOLE SODIUM 20 MG: 20 TABLET, DELAYED RELEASE ORAL at 06:30

## 2019-04-25 RX ADMIN — GLIPIZIDE 10 MG: 5 TABLET ORAL at 06:30

## 2019-04-25 RX ADMIN — Medication 10 ML: at 20:25

## 2019-04-25 RX ADMIN — SIMVASTATIN 20 MG: 20 TABLET, FILM COATED ORAL at 20:25

## 2019-04-25 RX ADMIN — FUROSEMIDE 40 MG: 10 INJECTION, SOLUTION INTRAMUSCULAR; INTRAVENOUS at 08:21

## 2019-04-25 NOTE — PROGRESS NOTES
Nephrology Progress Note    ASSESSMENT AND PLAN:    76 yo man admitted with increasing sob, chest pain, leg swelling and elevated cardiac biomarkers including BNP and troponin to 0.5. Has cardiorenal syndrome along with this with MIKE. Has underlying CKD stage 4 w/ baseline Scr ~2.4-2.6. W/ MIKE possibly 2/2 CRS. Also likely w/ CHF (LVEF 60%). Rapid afib that is new. Now on heparin gtt, rate better.  Remains SOB     - discussed w/ Dr. Esau Cheadle, ok to switch to lasix gtt  - agree w/ checking D-dimer, considering venous duplex as PE possible as well  - if need for cardiac cath, discussed w/ patient risk of requiring IHD if function worsens, he is agreeable  - closely monitor UOP    Patient Active Problem List:     Claudication of both lower extremities ([address]     Peripheral vascular disease ([facility] [address]     CKD (chronic kidney disease), stage III ([address]     Type 2 diabetes mellitus ([address]     Essential hypertension     S/P peripheral artery angioplasty     Shortness of breath      Subjective:  Admit Date: 4/24/2019    Interval History: remains on O2, feels SOB, on heparin gtt, HR better, getting lasix, -1.5L    Medications:  Scheduled Meds:   amiodarone  200 mg Oral BID    aspirin  81 mg Oral Daily    clopidogrel  75 mg Oral Daily    glipiZIDE  10 mg Oral BID AC    hydrOXYzine  25 mg Oral BID    insulin glargine  12 Units Subcutaneous Nightly    levothyroxine  200 mcg Oral Daily    metoprolol tartrate  25 mg Oral BID    therapeutic multivitamin-minerals  1 tablet Oral Daily    simvastatin  20 mg Oral Nightly    insulin lispro  0-6 Units Subcutaneous TID WC    insulin lispro  0-3 Units Subcutaneous Nightly    heparin (porcine)  4,000 Units Intravenous Once    pantoprazole  20 mg Oral QAM AC     Continuous Infusions:   furosemide (LASIX) 1mg/ml infusion 5 mg/hr (04/25/19 1334)    heparin (porcine) 13.3 mL/hr (04/25/19 1304)    dextrose         CBC:   Recent Labs     04/24/19  0945 04/25/19  0349   WBC 11.6* 11.0* HGB 12.0* 11.2*    246     CMP:    Recent Labs     04/24/19  0945 04/25/19  0349    138   K 4.5 4.3    103   CO2 25 21   BUN 69* 72*   CREATININE 3.43* 3.60*   GLUCOSE 147* 162*   CALCIUM 8.2* 7.9*   LABGLOM 17.6* 16.6*     Troponin:   Recent Labs     04/24/19  2338   TROPONINI 0.448*     BNP: No results for input(s): BNP in the last 72 hours. INR:   Recent Labs     04/24/19  0945   INR 1.0     Lipids: No results for input(s): CHOL, LDLDIRECT, TRIG, HDL, AMYLASE, LIPASE in the last 72 hours. Liver:   Recent Labs     04/24/19 0945   AST 18   ALT 19   ALKPHOS 88   PROT 6.1*   LABALBU 3.0*   BILITOT 0.4     Iron:  No results for input(s): IRONS, FERRITIN in the last 72 hours. Invalid input(s): LABIRONS  Urinalysis: No results for input(s): UA in the last 72 hours.     Objective:  Vitals: BP (!) 168/58   Pulse 64   Temp 97.9 °F (36.6 °C) (Oral)   Resp 16   Ht 5' 10\" (1.778 m)   Wt 208 lb 8.9 oz (94.6 kg)   SpO2 99%   BMI 29.92 kg/m²    Wt Readings from Last 3 Encounters:   04/25/19 208 lb 8.9 oz (94.6 kg)   04/18/19 213 lb (96.6 kg)   04/01/19 213 lb 6.4 oz (96.8 kg)      24HR INTAKE/OUTPUT:      Intake/Output Summary (Last 24 hours) at 4/25/2019 1348  Last data filed at 4/25/2019 1343  Gross per 24 hour   Intake 800 ml   Output 3250 ml   Net -2450 ml       General: alert, in no apparent distress  HEENT: normocephalic, atraumatic, anicteric  Neck: supple, no mass  Lungs: non-labored respirations, no inspiratory crackles, diminished  Heart: regular rate and rhythm, no murmurs or rubs  Abdomen: soft, non-tender, non-distended  Ext: no cyanosis, 1+ BLE peripheral edema  Neuro: alert and oriented, no gross abnormalities  Psych: normal mood and affect  Skin: no rash      Electronically signed by Miladys Harry MD

## 2019-04-25 NOTE — FLOWSHEET NOTE
MR called, pt HR 38 and flipped to Sinus, BP stable, stopped Cardizem gtt. Notified MR to call if HR begins to increase. Will continue to monitor.

## 2019-04-25 NOTE — PROGRESS NOTES
Nutrition Education    Type and Reason for Visit: Consult, Patient Education(CHF)    Pt's wife does the cooking and shopping. Pt states that she buys low sodium items and does not cook with salt, but he adds salt at the table. Consumes high sodium foods (chips, deli meats). · Verbally reviewed following information with [facility] Low Sodium Guidelines  · Written educational materials provided. · Contact name and number provided. · Refer to Patient Education activity for more details.     Electronically signed by Jumana Copeland RD, LD on 4/25/19 at 1:34 PM

## 2019-04-25 NOTE — CARE COORDINATION
Care Transition Nurse met with patient and his wife at bedside. Stressed importance of phoning physician promptly for symptoms in the yellow zone and ems for symptoms in the red zone. Discussed causes of CHF, symptoms, daily weights, BP management, Explained the importance of weighing self daily and phoning cardiologist for weight gain of 3 or more pounds in 1-7 days, sob, edema, and importance of following a low sodium diet, fluids to consider if restricted, medications, vaccines, cardiac rehab. Dietician has already seen patient and done teaching for low sodium diet. Reviewed fluids in the case that cardiologist would ever restrict fluids. Stressed importance of follow up with cardiologist within one week of discharge. Patient voiced understanding. Patient is an ex-smoker. His wife states she reads food labels and is more health conscious than patient. Explained to patient that it will be very important for him to start following a low salt diet. Patient is interested in Cardiac Rehab.  Advised to discuss this with his cardiologist.

## 2019-04-25 NOTE — PROGRESS NOTES
[facility] OF [facility] [place] Note      Patient: Moises Toribio  Unit/Bed: J316/U077-69  YOB: 1945  MRN: [id number]  Admit Date:  4/24/2019  HOSPITAL day # 2      Subjective Complaint:   Complains of being anxious, inability to lay back, due to shortness of breath. Intermittent retrosternal burning sensation. Requesting something to calm notes. Wants to get on with testing. Physical Examination:     BP (!) 168/58   Pulse 64   Temp 97.9 °F (36.6 °C) (Oral)   Resp 16   Ht 5' 10\" (1.778 m)   Wt 208 lb 8.9 oz (94.6 kg)   SpO2 99%   BMI 29.92 kg/m²         Intake/Output Summary (Last 24 hours) at 4/25/2019 1217  Last data filed at 4/25/2019 0554  Gross per 24 hour   Intake 800 ml   Output 2300 ml   Net -1500 ml            Weights  Wt Readings from Last 3 Encounters:   04/25/19 208 lb 8.9 oz (94.6 kg)   04/18/19 213 lb (96.6 kg)   04/01/19 213 lb 6.4 oz (96.8 kg)                  Moises Toribio examined at bedside . oriented times 3. Focused exam reveals:     Cardiac: Heart regular rate and rhythm     Lungs:  clear to auscultation bilaterally-  markedly diminished breath sounds posterior lung fields especially lower one third up. Distant breath sounds throughout. No audible crepitations or wheezing. Extremities:   Trivial edema. Telemetry:      normal sinus          LABS:   CBC:   Recent Labs     04/24/19  0945 04/25/19  0349   WBC 11.6* 11.0*   HGB 12.0* 11.2*    246      BMP:    Recent Labs     04/24/19  0945 04/25/19  0349    138   K 4.5 4.3    103   CO2 25 21   BUN 69* 72*   CREATININE 3.43* 3.60*   GLUCOSE 147* 162*              Troponin: No results for input(s): TROPONINT in the last 72 hours. BNP:   Recent Labs     04/24/19  0945   PROBNP 39,586      INR:   Recent Labs     04/24/19  0945   INR 1.0      Mg: No results for input(s): MG in the last 72 hours.     Cardiac Testing:   Atrial fibrillaiton with rapid rate during study   Moderately dilated left atrium.   Normal left ventricle structure and function.   Left ventricular ejection fraction is visually estimated at 60%.  Mild LVH   Normal right atrium.   Normal right ventricle structure and function.   Normal right ventricle systolic pressure.   Normal valvular structure and function.   No significant regurgitant or stenotic valvular lesions. EKG shows sinus rhythm in the 60s, lateral ST-T abnormality normal intervals  Chest x-ray shows right pleural effusion and mild pulmonary vascular congestion unchanged from admission. Assessment:  1. Class IV left ventricular heart failure with preserved LV systolic function, congestive heart failure, subacute onset symptoms of one week duration. 2. Angina pectoris, probable non-ST segment elevation myocardial infarction. Mechanism unclear, could be either related to rapid rate and supply demand mismatch or related to plaque rupture. 3. Chronic kidney disease stage IV  4. Paroxysmal atrial fibrillation with rapid ventricular rate  5. Extensive vascular disease to include cerebrovascular and peripheral vascular disease, high pretest probability of coronary atherosclerosis as well. 6. Retrosternal burning sensation-assess his anginal equivalent. 7. Renal function complicates management issues and testing. 8. High risk medication-eliquis  9. Chads 2 Vasc  score of at least 4  /5        Plan:  1. Favor cardiac catheterization and possible intervention, I have briefly discussed procedure risks benefits and alternatives with him, I would like nephrology input as well and have discussed with nephrology it personally. Discussed with patient that there is a high chance that he may end up with dialysis would prefer to use minimum contrast and staged procedure is warranted. My concern is that he is still decompensated heart failure  2. Pathophysiology of atrial fibrillation, the risk of stroke, and anticoagulation issues were discussed.   The risks of anticoagulation were discussed included but were not limited to the risk of bleeding with over anticoagulation and the lack of cardioembolic protection with under anticoagulation, and this is particularly relevant to the use of warfarin. Patient's [id number]  score is 4   3. I would like to start a Lasix infusion,and  renal dose dopamine, if okay with nephrology. At baseline however he has chronic kidney disease stage IV. 4.   Discussed with nephrology, we will try Lasix infusion. My guess is that patient has severe diastolic dysfunction related to coronary artery disease and intravascular volume overload from chronic kidney disease.        5.  Amiodarone 200 mg by mouth twice a day  Electronically signed by Adin Romano MD on 4/25/2019 at 12:17 PM

## 2019-04-26 ENCOUNTER — APPOINTMENT (OUTPATIENT)
Dept: CARDIAC CATH/INVASIVE PROCEDURES | Age: 74
DRG: 280 | End: 2019-04-26
Payer: MEDICARE

## 2019-04-26 LAB
ANION GAP SERPL CALCULATED.3IONS-SCNC: 13 MEQ/L (ref 9–15)
APTT: 46.2 SEC (ref 21.6–35.4)
APTT: 53.8 SEC (ref 21.6–35.4)
BUN BLDV-MCNC: 69 MG/DL (ref 8–23)
CALCIUM SERPL-MCNC: 7.9 MG/DL (ref 8.5–9.9)
CHLORIDE BLD-SCNC: 104 MEQ/L (ref 95–107)
CO2: 25 MEQ/L (ref 20–31)
CREAT SERPL-MCNC: 4.1 MG/DL (ref 0.7–1.2)
GFR AFRICAN AMERICAN: 17.3
GFR NON-AFRICAN AMERICAN: 14.3
GLUCOSE BLD-MCNC: 121 MG/DL (ref 60–115)
GLUCOSE BLD-MCNC: 142 MG/DL (ref 70–99)
GLUCOSE BLD-MCNC: 248 MG/DL (ref 60–115)
GLUCOSE BLD-MCNC: 57 MG/DL (ref 60–115)
GLUCOSE BLD-MCNC: 84 MG/DL (ref 60–115)
GLUCOSE BLD-MCNC: 97 MG/DL (ref 60–115)
PERFORMED ON: ABNORMAL
PERFORMED ON: NORMAL
PERFORMED ON: NORMAL
PLATELET # BLD: 217 K/UL (ref 130–400)
POC ACTIVATED CLOTTING TIME KAOLIN: 169 SEC (ref 82–152)
POC ACTIVATED CLOTTING TIME KAOLIN: 180 SEC (ref 82–152)
POC ACTIVATED CLOTTING TIME KAOLIN: 208 SEC (ref 82–152)
POC SAMPLE TYPE: ABNORMAL
POTASSIUM SERPL-SCNC: 4.1 MEQ/L (ref 3.4–4.9)
SODIUM BLD-SCNC: 142 MEQ/L (ref 135–144)

## 2019-04-26 PROCEDURE — 2060000000 HC ICU INTERMEDIATE R&B

## 2019-04-26 PROCEDURE — B2111ZZ FLUOROSCOPY OF MULTIPLE CORONARY ARTERIES USING LOW OSMOLAR CONTRAST: ICD-10-PCS | Performed by: INTERNAL MEDICINE

## 2019-04-26 PROCEDURE — 6360000002 HC RX W HCPCS

## 2019-04-26 PROCEDURE — 6370000000 HC RX 637 (ALT 250 FOR IP): Performed by: INTERNAL MEDICINE

## 2019-04-26 PROCEDURE — 85730 THROMBOPLASTIN TIME PARTIAL: CPT

## 2019-04-26 PROCEDURE — C1887 CATHETER, GUIDING: HCPCS

## 2019-04-26 PROCEDURE — 51702 INSERT TEMP BLADDER CATH: CPT

## 2019-04-26 PROCEDURE — 85049 AUTOMATED PLATELET COUNT: CPT

## 2019-04-26 PROCEDURE — C1894 INTRO/SHEATH, NON-LASER: HCPCS

## 2019-04-26 PROCEDURE — 82948 REAGENT STRIP/BLOOD GLUCOSE: CPT

## 2019-04-26 PROCEDURE — 6360000002 HC RX W HCPCS: Performed by: INTERNAL MEDICINE

## 2019-04-26 PROCEDURE — 2709999900 HC NON-CHARGEABLE SUPPLY

## 2019-04-26 PROCEDURE — 93571 IV DOP VEL&/PRESS C FLO 1ST: CPT | Performed by: INTERNAL MEDICINE

## 2019-04-26 PROCEDURE — 93458 L HRT ARTERY/VENTRICLE ANGIO: CPT | Performed by: INTERNAL MEDICINE

## 2019-04-26 PROCEDURE — 80048 BASIC METABOLIC PNL TOTAL CA: CPT

## 2019-04-26 PROCEDURE — B2151ZZ FLUOROSCOPY OF LEFT HEART USING LOW OSMOLAR CONTRAST: ICD-10-PCS | Performed by: INTERNAL MEDICINE

## 2019-04-26 PROCEDURE — C1769 GUIDE WIRE: HCPCS

## 2019-04-26 PROCEDURE — 93005 ELECTROCARDIOGRAM TRACING: CPT

## 2019-04-26 PROCEDURE — 2580000003 HC RX 258: Performed by: INTERNAL MEDICINE

## 2019-04-26 PROCEDURE — 2580000003 HC RX 258

## 2019-04-26 PROCEDURE — 85347 COAGULATION TIME ACTIVATED: CPT

## 2019-04-26 PROCEDURE — 2500000003 HC RX 250 WO HCPCS

## 2019-04-26 PROCEDURE — 36415 COLL VENOUS BLD VENIPUNCTURE: CPT

## 2019-04-26 PROCEDURE — 4A023N7 MEASUREMENT OF CARDIAC SAMPLING AND PRESSURE, LEFT HEART, PERCUTANEOUS APPROACH: ICD-10-PCS | Performed by: INTERNAL MEDICINE

## 2019-04-26 RX ORDER — SODIUM CHLORIDE 9 MG/ML
INJECTION, SOLUTION INTRAVENOUS CONTINUOUS
Status: ACTIVE | OUTPATIENT
Start: 2019-04-26 | End: 2019-04-27

## 2019-04-26 RX ORDER — ATORVASTATIN CALCIUM 40 MG/1
40 TABLET, FILM COATED ORAL NIGHTLY
Status: DISCONTINUED | OUTPATIENT
Start: 2019-04-26 | End: 2019-04-28 | Stop reason: HOSPADM

## 2019-04-26 RX ORDER — ACETAMINOPHEN 325 MG/1
650 TABLET ORAL EVERY 4 HOURS PRN
Status: DISCONTINUED | OUTPATIENT
Start: 2019-04-26 | End: 2019-04-28 | Stop reason: HOSPADM

## 2019-04-26 RX ORDER — SODIUM CHLORIDE 0.9 % (FLUSH) 0.9 %
10 SYRINGE (ML) INJECTION EVERY 12 HOURS SCHEDULED
Status: DISCONTINUED | OUTPATIENT
Start: 2019-04-26 | End: 2019-04-28 | Stop reason: HOSPADM

## 2019-04-26 RX ORDER — SODIUM CHLORIDE 0.9 % (FLUSH) 0.9 %
10 SYRINGE (ML) INJECTION PRN
Status: DISCONTINUED | OUTPATIENT
Start: 2019-04-26 | End: 2019-04-28 | Stop reason: HOSPADM

## 2019-04-26 RX ORDER — METOPROLOL SUCCINATE 25 MG/1
25 TABLET, EXTENDED RELEASE ORAL DAILY
Status: DISCONTINUED | OUTPATIENT
Start: 2019-04-26 | End: 2019-04-28 | Stop reason: HOSPADM

## 2019-04-26 RX ADMIN — ATORVASTATIN CALCIUM 40 MG: 40 TABLET, FILM COATED ORAL at 21:33

## 2019-04-26 RX ADMIN — SODIUM CHLORIDE: 9 INJECTION, SOLUTION INTRAVENOUS at 09:35

## 2019-04-26 RX ADMIN — METOPROLOL SUCCINATE 25 MG: 25 TABLET, FILM COATED, EXTENDED RELEASE ORAL at 18:51

## 2019-04-26 RX ADMIN — LEVOTHYROXINE SODIUM 200 MCG: 0.2 TABLET ORAL at 06:33

## 2019-04-26 RX ADMIN — ASPIRIN 81 MG: 81 TABLET, COATED ORAL at 09:32

## 2019-04-26 RX ADMIN — HYDROXYZINE HYDROCHLORIDE 25 MG: 25 TABLET ORAL at 21:33

## 2019-04-26 RX ADMIN — Medication 10 ML: at 21:33

## 2019-04-26 RX ADMIN — Medication 10 ML: at 09:37

## 2019-04-26 RX ADMIN — PANTOPRAZOLE SODIUM 20 MG: 20 TABLET, DELAYED RELEASE ORAL at 06:33

## 2019-04-26 RX ADMIN — AMIODARONE HYDROCHLORIDE 200 MG: 200 TABLET ORAL at 21:33

## 2019-04-26 RX ADMIN — AMIODARONE HYDROCHLORIDE 200 MG: 200 TABLET ORAL at 09:33

## 2019-04-26 RX ADMIN — FUROSEMIDE 5 MG/HR: 10 INJECTION, SOLUTION INTRAVENOUS at 10:36

## 2019-04-26 RX ADMIN — CLOPIDOGREL BISULFATE 75 MG: 75 TABLET, FILM COATED ORAL at 09:32

## 2019-04-26 RX ADMIN — INSULIN GLARGINE 12 UNITS: 100 INJECTION, SOLUTION SUBCUTANEOUS at 21:36

## 2019-04-26 RX ADMIN — DIAZEPAM 5 MG: 5 TABLET ORAL at 09:33

## 2019-04-26 RX ADMIN — METOPROLOL TARTRATE 25 MG: 25 TABLET ORAL at 09:33

## 2019-04-26 RX ADMIN — HEPARIN SODIUM AND DEXTROSE 14 UNITS/KG/HR: 10000; 5 INJECTION INTRAVENOUS at 12:17

## 2019-04-26 RX ADMIN — HYDROXYZINE HYDROCHLORIDE 25 MG: 25 TABLET ORAL at 07:36

## 2019-04-26 ASSESSMENT — PAIN SCALES - GENERAL: PAINLEVEL_OUTOF10: 0

## 2019-04-26 NOTE — PROGRESS NOTES
[facility] OF [facility] [place] Note      Patient: Brooklyn Stair  Unit/Bed: A946/P868-01  YOB: 1945  MRN: [id number]  Admit Date:  4/24/2019  HOSPITAL day # 2      Subjective Complaint:    Reports feeling much improved, says he was able to lay flat and sleep for several hours, which is a great improvement. Chest discomfort has subsided. No palpitations. Physical Examination:     BP (!) 138/59   Pulse 62   Temp 97.2 °F (36.2 °C) (Temporal)   Resp 12   Ht 5' 10\" (1.778 m)   Wt 204 lb 5.9 oz (92.7 kg)   SpO2 99%   BMI 29.32 kg/m²         Intake/Output Summary (Last 24 hours) at 4/26/2019 1811  Last data filed at 4/26/2019 1805  Gross per 24 hour   Intake 990 ml   Output 6075 ml   Net -5085 ml            Weights  Wt Readings from Last 3 Encounters:   04/26/19 204 lb 5.9 oz (92.7 kg)   04/18/19 213 lb (96.6 kg)   04/01/19 213 lb 6.4 oz (96.8 kg)                  Brooklyn Stair examined at bedside . oriented times 3. Focused exam reveals:     Cardiac: Heart regular rate and rhythm     Lungs:  clear to auscultation bilaterally-  markedly diminished breath sounds posterior lung fields especially lower one third up. Distant breath sounds throughout. No audible crepitations or wheezing. Extremities:   Trivial edema. Telemetry:      normal sinus  . LABS:   CBC:   Recent Labs     04/24/19  0945 04/25/19  0349 04/26/19  0605   WBC 11.6* 11.0*  --    HGB 12.0* 11.2*  --     246 217      BMP:    Recent Labs     04/24/19  0945 04/25/19  0349 04/26/19  0605    138 142   K 4.5 4.3 4.1    103 104   CO2 25 21 25   BUN 69* 72* 69*   CREATININE 3.43* 3.60* 4.10*   GLUCOSE 147* 162* 142*              Troponin: No results for input(s): TROPONINT in the last 72 hours. BNP:   Recent Labs     04/24/19  0945   PROBNP 39,586      INR:   Recent Labs     04/24/19  0945   INR 1.0      Mg: No results for input(s): MG in the last 72 hours.     Cardiac Testing:   Atrial fibrillaiton with rapid rate during study   Moderately dilated left atrium.   Normal left ventricle structure and function.   Left ventricular ejection fraction is visually estimated at 60%.  Mild LVH   Normal right atrium.   Normal right ventricle structure and function.   Normal right ventricle systolic pressure.   Normal valvular structure and function.   No significant regurgitant or stenotic valvular lesions. EKG shows sinus rhythm in the 60s, lateral ST-T abnormality normal intervals  Chest x-ray shows right pleural effusion and mild pulmonary vascular congestion unchanged from admission. Assessment:    Raised on cardiac catheterization findings, patient has large territory of ischemia involving the anterior wall, the inferior wall is collateral dependent, the circumflex territory has disease as well. LVEDP is normal after diuresis of close to 5 L. Atrial fibrillation clearly precipitated the heart failure in the setting of elevated LVEDP to begin with, and chronic diastolic heart failure related to hypertensive renal disease and coronary artery disease. 1.   Class IV left ventricular heart failure, acute on chronic with preserved LV systolic function, congestive h symptoms of one week duration. 2. Angina pectoris, probable non-ST segment elevation myocardial infarction. Mechanism unclear, could be either related to rapid rate and supply demand mismatch or related to plaque rupture. 3. Chronic kidney disease stage IV  4. Paroxysmal atrial fibrillation with rapid ventricular rate on the in sinus rhythm on high risk medication amiodarone. 5. Extensive vascular disease to include cerebrovascular and peripheral vascular disease, high pretest probability of coronary atherosclerosis as well. 6. Retrosternal burning sensation- his anginal equivalent. 7. Renal function complicates management issues and testing. 8. High risk medication-Heparin and amiodarone   9.  Chads 2 Vasc  score of at least 4

## 2019-04-26 NOTE — FLOWSHEET NOTE
Patient arrived to room. VS stable and rt groin opsite dressing dry and intact. Pedal pulses palpable. accu check done =57, patient asymptomatic. Patient given 4oz of juice and his dinner tray.

## 2019-04-26 NOTE — FLOWSHEET NOTE
Patient's accu check rechecked = 84, Patient's wife at bedside. Rt groin site stable, pedal pulses palpable.

## 2019-04-26 NOTE — OP NOTE
INDICATIONS:  The patient is a 75 y.o. male  with history of remote stenting of the left anterior descending artery, presents with class IV congestive biventricular heart failure rest angina pectoris and atrial fibrillation. Has extensive vascular disease to include cerebrovascular and peripheral vascular disease. Has chronic kidney disease stage IV. Despite all that, symptoms warranted cardiac catheterization, and the risk of kidney injury and possibly needing hemodialysis was discussed with patient. He agreed to proceed. PROCEDURE:  Left heart cath, coronary angiography, left ventricular end-diastolic pressure, LV AO pullback and selective right common femoral angiography was performed. IFR of the distal left main coronary artery and the proximal left anterior descending artery was performed. Left ventriculography was deferred in order to minimize contrast.    Benefits, alternatives and risks of the procedure were explained to the patient to include but not limited to MI, Stroke, Death, Allergic reaction to dye, bleeding, risk of kidney injury, and possible need for emergent coronary artery bypass grafting and informed consent was obtained. The patient was brought to the cath lab and was prepped and draped in the normal sterile technique. IV conscious sedation was maintained. 1% lidocaine was used for local anesthesia. DESCRIPTION OF PROCEDURE: Under local anesthesia, a 5-Senegalese short sheath was  placed in the right common femoral artery by single anterior percutaneous stick. Selective right common femoral angiography showed that the access was in the right common femoral artery above its bifurcation. Was diffuse calcification in the iliac artery and the common femoral artery. A 5-Senegalese JL4 diagnostic catheter was advanced over a 0.035 guidewire and the left main coronary artery was selectively engaged. Angiography of the left system was performed in multiple orthogonal views.  The 5-Senegalese JL4 diagnostic catheter and the guidewire were removed. A 5-Burundian 3DRC diagnostic catheter was advanced over a 0.035 guidewire and the right coronary artery was selectively engaged. Angiography of the right coronary artery was performed in multiple orthogonal views. The 5-[place]ian [place] diagnostic catheter and guidewire were removed. A 5-Burundian angled pigtail catheter was advanced over a 0.035 guidewire across the aortic valve into the left ventricle. Left ventricular end-diastolic pressure was measured. Left ventriculography was deferred. Slow manual pullback was performed across the aortic valve. At the conclusion of the procedure, it was decided to proceed with IFR  of the proximal left anterior descending artery and the distal left main coronary artery. .Heparin was administered and ACT was documented. A 5 [place]  guide catheter was advanced over a 0.035 guidewire and the left main coronary artery was selectively engaged. there are active flow wire by [facility] therapeutics was calibrated. The wire was advanced into the guide catheter. The guide catheter was dizzy engaged from the left main coronary artery, and the wire was advanced distal to the guide catheter. Aortic pressure as monitored by the flow wire was normalized to the aortic pressure as monitored by the guide catheter. The guide catheter was then selectively engaged in the left main coronary artery. The flow wire was advanced into the distal mid left anterior descending artery. IFR was measured at 0.76. There was separation between the systolic and diastolic flow velocities. A slow manual pullback of the flow wire was performed, and the IFR quickly normalized in the  proximal LAD. The guide catheter and the guidewire were removed. The right femoral artery sheath was secured in place, and removed later and manual hemostasis achieved. There were no immediate complications. HEMODYNAMIC / ANGIOGRAPHIC DATA:    1.  Left ventricular end diastolic pressure was 64-98 mmHg. No systolic gradient across the aortic valve. 2. Left ventriculography was deferred . 3. The left main coronary artery  has eEccentric 30 to 40% stenosis. 4. The left anterior descending artery is a large vessel that curves around the left ventricular apex. Radiopaque stent is noted in the proximal left anterior descending artery in its distal segment, with about 20% narrowing within the stent. A large diagonal arises proximal to the stent or at the origin of the stent. Proximal to the stented segment, there is a short segment in the proximal LAD, difficult to lay out, angiographically about 70% stenosis, best appreciated in the cranial view. Mid LAD has 40% to 50% smooth stenosis, distal LAD has less than 30% stenosis. Norma Gonzalez 5. The left circumflex is nondominant, measuring about 2.5 mm in the proximal segment, proximal vessel has about 20 stenosis. Distal AV groove left circumflex artery has 80% stenosis prior to bifurcation into 2 obtuse marginal branches, about 2.5 and 2.25 mm each. A moderately large atrial branch also origin 8 from this bifurcation point. 6. The right coronary artery is dominant, large, totally occluded flush with the ostium, chronic occlusion, mid and distal vessel filled by grade 2 left to right collaterals. 7. IFR of the proximal left anterior descending artery was 8.12, with systolic and diastolic separation of waveforms. This lesion is hemodynamically and functionally significant. 8. IFR of the distal left main stenosis is 0.96, not  Functionally significant. RECOMMENDATIONS:  Post-procedure care will focus on prevention of any ischemic events and congestive complications. Aggressive risk factor modification and dual antiplatelet therapy are recommended. Renal function will be closely followed. May need dialysis temporarily. Anticoagulation therapy will be initiated for stroke prevention of atrial fibrillation.   Additional intervention will be recommended based on clinical course, renal function, but given the complexity of his disease, cannot be done at [facility].     Tobi Jung MD

## 2019-04-26 NOTE — PROGRESS NOTES
Attempted to see patient but down in cath lab  Noted worsening Scr, may worsen even more following contrast administration  Further decisions based on cath results  Pt agreeable to dialysis if needed     Nick Jacinto MD

## 2019-04-26 NOTE — FLOWSHEET NOTE
Patient anxious prior to leaving for cath holding. Patient given pre procedure valium as ordered. Patient called wife to  Tell her that he would be going down around 11:30 AM per cath holding staff. Dr. Arsenio Domingo called regarding heparin GTT. She was in procedure but per cath lab the heparin was to continue.

## 2019-04-27 LAB
ANION GAP SERPL CALCULATED.3IONS-SCNC: 14 MEQ/L (ref 9–15)
BUN BLDV-MCNC: 61 MG/DL (ref 8–23)
CALCIUM SERPL-MCNC: 7.9 MG/DL (ref 8.5–9.9)
CHLORIDE BLD-SCNC: 101 MEQ/L (ref 95–107)
CO2: 24 MEQ/L (ref 20–31)
CREAT SERPL-MCNC: 4.36 MG/DL (ref 0.7–1.2)
GFR AFRICAN AMERICAN: 16.1
GFR NON-AFRICAN AMERICAN: 13.3
GLUCOSE BLD-MCNC: 204 MG/DL (ref 60–115)
GLUCOSE BLD-MCNC: 232 MG/DL (ref 60–115)
GLUCOSE BLD-MCNC: 238 MG/DL (ref 60–115)
GLUCOSE BLD-MCNC: 239 MG/DL (ref 60–115)
GLUCOSE BLD-MCNC: 246 MG/DL (ref 70–99)
PERFORMED ON: ABNORMAL
POTASSIUM SERPL-SCNC: 4.6 MEQ/L (ref 3.4–4.9)
SODIUM BLD-SCNC: 139 MEQ/L (ref 135–144)

## 2019-04-27 PROCEDURE — 6370000000 HC RX 637 (ALT 250 FOR IP): Performed by: INTERNAL MEDICINE

## 2019-04-27 PROCEDURE — 2060000000 HC ICU INTERMEDIATE R&B

## 2019-04-27 PROCEDURE — 2580000003 HC RX 258: Performed by: INTERNAL MEDICINE

## 2019-04-27 PROCEDURE — 80048 BASIC METABOLIC PNL TOTAL CA: CPT

## 2019-04-27 PROCEDURE — 36415 COLL VENOUS BLD VENIPUNCTURE: CPT

## 2019-04-27 PROCEDURE — 93005 ELECTROCARDIOGRAM TRACING: CPT

## 2019-04-27 RX ORDER — HYDRALAZINE HYDROCHLORIDE 50 MG/1
50 TABLET, FILM COATED ORAL EVERY 12 HOURS SCHEDULED
Status: DISCONTINUED | OUTPATIENT
Start: 2019-04-27 | End: 2019-04-28 | Stop reason: HOSPADM

## 2019-04-27 RX ORDER — ISOSORBIDE MONONITRATE 30 MG/1
30 TABLET, EXTENDED RELEASE ORAL DAILY
Status: DISCONTINUED | OUTPATIENT
Start: 2019-04-27 | End: 2019-04-28

## 2019-04-27 RX ADMIN — APIXABAN 5 MG: 5 TABLET, FILM COATED ORAL at 20:40

## 2019-04-27 RX ADMIN — ISOSORBIDE MONONITRATE 30 MG: 30 TABLET, EXTENDED RELEASE ORAL at 20:40

## 2019-04-27 RX ADMIN — Medication 10 ML: at 09:11

## 2019-04-27 RX ADMIN — Medication 10 ML: at 09:12

## 2019-04-27 RX ADMIN — INSULIN LISPRO 2 UNITS: 100 INJECTION, SOLUTION INTRAVENOUS; SUBCUTANEOUS at 09:02

## 2019-04-27 RX ADMIN — GLIPIZIDE 10 MG: 5 TABLET ORAL at 16:52

## 2019-04-27 RX ADMIN — HYDROXYZINE HYDROCHLORIDE 25 MG: 25 TABLET ORAL at 20:40

## 2019-04-27 RX ADMIN — PANTOPRAZOLE SODIUM 20 MG: 20 TABLET, DELAYED RELEASE ORAL at 06:08

## 2019-04-27 RX ADMIN — INSULIN GLARGINE 12 UNITS: 100 INJECTION, SOLUTION SUBCUTANEOUS at 20:41

## 2019-04-27 RX ADMIN — AMIODARONE HYDROCHLORIDE 200 MG: 200 TABLET ORAL at 20:40

## 2019-04-27 RX ADMIN — GLIPIZIDE 10 MG: 5 TABLET ORAL at 09:01

## 2019-04-27 RX ADMIN — MULTIPLE VITAMINS W/ MINERALS TAB 1 TABLET: TAB at 09:01

## 2019-04-27 RX ADMIN — Medication 10 ML: at 20:40

## 2019-04-27 RX ADMIN — METOPROLOL SUCCINATE 25 MG: 25 TABLET, FILM COATED, EXTENDED RELEASE ORAL at 09:01

## 2019-04-27 RX ADMIN — HYDROXYZINE HYDROCHLORIDE 25 MG: 25 TABLET ORAL at 09:01

## 2019-04-27 RX ADMIN — AMIODARONE HYDROCHLORIDE 200 MG: 200 TABLET ORAL at 09:01

## 2019-04-27 RX ADMIN — LEVOTHYROXINE SODIUM 200 MCG: 0.2 TABLET ORAL at 06:08

## 2019-04-27 RX ADMIN — HYDRALAZINE HYDROCHLORIDE 50 MG: 50 TABLET, FILM COATED ORAL at 20:40

## 2019-04-27 RX ADMIN — APIXABAN 5 MG: 5 TABLET, FILM COATED ORAL at 09:01

## 2019-04-27 RX ADMIN — Medication 10 ML: at 09:13

## 2019-04-27 RX ADMIN — CLOPIDOGREL BISULFATE 75 MG: 75 TABLET, FILM COATED ORAL at 09:01

## 2019-04-27 RX ADMIN — ATORVASTATIN CALCIUM 40 MG: 40 TABLET, FILM COATED ORAL at 20:40

## 2019-04-27 RX ADMIN — INSULIN LISPRO 2 UNITS: 100 INJECTION, SOLUTION INTRAVENOUS; SUBCUTANEOUS at 12:08

## 2019-04-27 RX ADMIN — INSULIN LISPRO 2 UNITS: 100 INJECTION, SOLUTION INTRAVENOUS; SUBCUTANEOUS at 16:53

## 2019-04-27 ASSESSMENT — PAIN SCALES - GENERAL: PAINLEVEL_OUTOF10: 0

## 2019-04-27 NOTE — PROGRESS NOTES
CBC:   Recent Labs     04/25/19  0349 04/26/19  0605   WBC 11.0*  --    HGB 11.2*  --     217     CMP:    Recent Labs     04/25/19  0349 04/26/19  0605 04/27/19  0618    142 139   K 4.3 4.1 4.6    104 101   CO2 21 25 24   BUN 72* 69* 61*   CREATININE 3.60* 4.10* 4.36*   GLUCOSE 162* 142* 246*   CALCIUM 7.9* 7.9* 7.9*   LABGLOM 16.6* 14.3* 13.3*     Troponin:   Recent Labs     04/24/19  2338   TROPONINI 0.448*     BNP: No results for input(s): BNP in the last 72 hours. INR:   No results for input(s): INR in the last 72 hours. Lipids: No results for input(s): CHOL, LDLDIRECT, TRIG, HDL, AMYLASE, LIPASE in the last 72 hours. Liver:   No results for input(s): AST, ALT, ALKPHOS, PROT, LABALBU, BILITOT in the last 72 hours. Invalid input(s): BILDIR  Iron:  No results for input(s): IRONS, FERRITIN in the last 72 hours. Invalid input(s): LABIRONS  Urinalysis: No results for input(s): UA in the last 72 hours.     Objective:  Vitals: BP (!) 163/62   Pulse 66   Temp 97.9 °F (36.6 °C)   Resp 18   Ht 5' 10\" (1.778 m)   Wt 204 lb 9.4 oz (92.8 kg)   SpO2 95%   BMI 29.36 kg/m²    Wt Readings from Last 3 Encounters:   04/27/19 204 lb 9.4 oz (92.8 kg)   04/18/19 213 lb (96.6 kg)   04/01/19 213 lb 6.4 oz (96.8 kg)      24HR INTAKE/OUTPUT:      Intake/Output Summary (Last 24 hours) at 4/27/2019 1050  Last data filed at 4/27/2019 0631  Gross per 24 hour   Intake 1080 ml   Output 3375 ml   Net -2295 ml       General: alert, in no apparent distress  HEENT: normocephalic, atraumatic, anicteric  Neck: supple, no mass  Lungs: non-labored respirations, no inspiratory crackles, diminished  Heart: regular rate and rhythm, no murmurs or rubs  Abdomen: soft, non-tender, non-distended  Ext: no cyanosis, 1+ BLE peripheral edema  Neuro: alert and oriented, no gross abnormalities  Psych: normal mood and affect  Skin: no rash      Electronically signed by Keshia Farmer MD

## 2019-04-27 NOTE — FLOWSHEET NOTE
Pt rechecked glucose, 83. Pt states that he feels better. Will continue to monitor.  Electronically signed by Rani Ortiz RN on 4/27/2019 at 6:27 AM

## 2019-04-27 NOTE — PROGRESS NOTES
[facility] OF THE [facility] Note      Patient: Ines Neville  Unit/Bed: R807/G858-73  YOB: 1945  MRN: [id number]  Admit Date:  4/24/2019  HOSPITAL day # 2      Subjective Complaint:    Reports feeling much improved, says he was able to lay flat and sleep for several hours, which is a great improvement. Chest discomfort has subsided. No palpitations. Physical Examination:     BP (!) 163/62   Pulse 66   Temp 97.9 °F (36.6 °C)   Resp 18   Ht 5' 10\" (1.778 m)   Wt 204 lb 9.4 oz (92.8 kg)   SpO2 95%   BMI 29.36 kg/m²         Intake/Output Summary (Last 24 hours) at 4/27/2019 1448  Last data filed at 4/27/2019 0631  Gross per 24 hour   Intake 1080 ml   Output 1975 ml   Net -895 ml            Weights  Wt Readings from Last 3 Encounters:   04/27/19 204 lb 9.4 oz (92.8 kg)   04/18/19 213 lb (96.6 kg)   04/01/19 213 lb 6.4 oz (96.8 kg)                  Ines Neville examined at bedside . oriented times 3. Focused exam reveals:     Cardiac: Heart regular rate and rhythm     Lungs:  clear to auscultation bilaterally-  markedly diminished breath sounds posterior lung fields especially lower one third up. Distant breath sounds throughout. No audible crepitations or wheezing. Extremities:   Trivial edema. Right groin soft and nontender. Telemetry:      normal sinus          LABS:   CBC:   Recent Labs     04/25/19  0349 04/26/19  0605   WBC 11.0*  --    HGB 11.2*  --     217      BMP:    Recent Labs     04/25/19  0349 04/26/19  0605 04/27/19  0618    142 139   K 4.3 4.1 4.6    104 101   CO2 21 25 24   BUN 72* 69* 61*   CREATININE 3.60* 4.10* 4.36*   GLUCOSE 162* 142* 246*              Troponin: No results for input(s): TROPONINT in the last 72 hours. BNP:   No results for input(s): PROBNP in the last 72 hours. INR:   No results for input(s): INR in the last 72 hours. Mg: No results for input(s): MG in the last 72 hours.     Cardiac Testing:   Atrial fibrillaiton with rapid rate during study   Moderately dilated left atrium.   Normal left ventricle structure and function.   Left ventricular ejection fraction is visually estimated at 60%.  Mild LVH   Normal right atrium.   Normal right ventricle structure and function.   Normal right ventricle systolic pressure.   Normal valvular structure and function.   No significant regurgitant or stenotic valvular lesions. EKG shows sinus rhythm in the 60s, lateral ST-T abnormality normal intervals  Chest x-ray shows right pleural effusion and mild pulmonary vascular congestion unchanged from admission. Assessment:    Based  on cardiac catheterization findings, patient has large territory of ischemia involving the anterior wall, the inferior wall is collateral dependent, the circumflex territory has disease as well. LVEDP is normal after diuresis of close to 5 L. Atrial fibrillation clearly precipitated the heart failure in the setting of elevated LVEDP to begin with, and chronic diastolic heart failure related to hypertensive renal disease and coronary artery disease. Blood pressure suboptimally controlled at this time. 1.   Class IV left ventricular heart failure, acute on chronic with preserved LV systolic function, congestive h symptoms of one week duration. 2. Angina pectoris, probable non-ST segment elevation myocardial infarction. Mechanism unclear, could be either related to rapid rate and supply demand mismatch or related to plaque rupture. 3. Chronic kidney disease stage IV  4. Paroxysmal atrial fibrillation with rapid ventricular rate on the in sinus rhythm on high risk medication amiodarone. 5. Extensive vascular disease to include cerebrovascular and peripheral vascular disease, high pretest probability of coronary atherosclerosis as well. 6. Retrosternal burning sensation- his anginal equivalent. 7. Renal function complicates management issues and testing.   8. High risk medication-Heparin and amiodarone   9. Chads 2 Vasc  score of at least 4  /5        Plan:    Extensive discussion with patient's wife daughter and son-in-law. All are in agreement that patient will be transferred to tertiary care center for a multi-team based approach to revascularization, in this patient with high risk of decompensation and multiple comorbidities. 1.  Pathophysiology of atrial fibrillation, the risk of stroke, and anticoagulation issues were discussed. The risks of anticoagulation were discussed included but were not limited to the risk of bleeding with over anticoagulation and the lack of cardioembolic protection with under anticoagulation, and this is particularly relevant to the use of warfarin. Patient's [id number]  score is 4   2. Creatinine has risen, however patient has diuresed very well on the Lasix infusion, we will hold for the next 24 hours, and continue to follow clinically. With restoration of sinus rhythm cardiodynamics should improve. 4.        Will consider elective PCI and stenting of the proximal left anterior descending artery-high risk procedure, cannot be done at [facility]. There is no urgency to proceed with this intervention, given anatomic characteristics of the vessel. 5. Amiodarone 200 mg by mouth twice a day  6   DC lasix drip   7. DC IV heparin  8. Eliquis 5mg bid started. 9.  Add hydralazine and by mouth nitrates.   Electronically signed by Latia Diez MD on 4/27/2019 at 2:48 PM

## 2019-04-28 VITALS
DIASTOLIC BLOOD PRESSURE: 70 MMHG | RESPIRATION RATE: 17 BRPM | HEIGHT: 70 IN | SYSTOLIC BLOOD PRESSURE: 157 MMHG | HEART RATE: 70 BPM | OXYGEN SATURATION: 95 % | TEMPERATURE: 98.3 F | BODY MASS INDEX: 29.29 KG/M2 | WEIGHT: 204.59 LBS

## 2019-04-28 LAB
ANION GAP SERPL CALCULATED.3IONS-SCNC: 14 MEQ/L (ref 9–15)
BUN BLDV-MCNC: 58 MG/DL (ref 8–23)
CALCIUM SERPL-MCNC: 7.7 MG/DL (ref 8.5–9.9)
CHLORIDE BLD-SCNC: 106 MEQ/L (ref 95–107)
CO2: 20 MEQ/L (ref 20–31)
CREAT SERPL-MCNC: 4.18 MG/DL (ref 0.7–1.2)
EKG ATRIAL RATE: 136 BPM
EKG ATRIAL RATE: 61 BPM
EKG ATRIAL RATE: 61 BPM
EKG ATRIAL RATE: 63 BPM
EKG ATRIAL RATE: 67 BPM
EKG ATRIAL RATE: 73 BPM
EKG P AXIS: 49 DEGREES
EKG P AXIS: 55 DEGREES
EKG P AXIS: 60 DEGREES
EKG P AXIS: 62 DEGREES
EKG P AXIS: 66 DEGREES
EKG P-R INTERVAL: 136 MS
EKG P-R INTERVAL: 138 MS
EKG P-R INTERVAL: 140 MS
EKG P-R INTERVAL: 142 MS
EKG P-R INTERVAL: 144 MS
EKG Q-T INTERVAL: 328 MS
EKG Q-T INTERVAL: 404 MS
EKG Q-T INTERVAL: 424 MS
EKG Q-T INTERVAL: 424 MS
EKG Q-T INTERVAL: 436 MS
EKG Q-T INTERVAL: 472 MS
EKG QRS DURATION: 84 MS
EKG QRS DURATION: 86 MS
EKG QRS DURATION: 88 MS
EKG QRS DURATION: 90 MS
EKG QRS DURATION: 92 MS
EKG QRS DURATION: 94 MS
EKG QTC CALCULATION (BAZETT): 426 MS
EKG QTC CALCULATION (BAZETT): 433 MS
EKG QTC CALCULATION (BAZETT): 445 MS
EKG QTC CALCULATION (BAZETT): 460 MS
EKG QTC CALCULATION (BAZETT): 475 MS
EKG QTC CALCULATION (BAZETT): 478 MS
EKG R AXIS: 44 DEGREES
EKG R AXIS: 48 DEGREES
EKG R AXIS: 50 DEGREES
EKG R AXIS: 51 DEGREES
EKG R AXIS: 55 DEGREES
EKG R AXIS: 57 DEGREES
EKG T AXIS: 106 DEGREES
EKG T AXIS: 143 DEGREES
EKG T AXIS: 153 DEGREES
EKG T AXIS: 165 DEGREES
EKG T AXIS: 201 DEGREES
EKG T AXIS: 65 DEGREES
EKG VENTRICULAR RATE: 128 BPM
EKG VENTRICULAR RATE: 61 BPM
EKG VENTRICULAR RATE: 61 BPM
EKG VENTRICULAR RATE: 63 BPM
EKG VENTRICULAR RATE: 67 BPM
EKG VENTRICULAR RATE: 73 BPM
GFR AFRICAN AMERICAN: 16.9
GFR NON-AFRICAN AMERICAN: 14
GLUCOSE BLD-MCNC: 162 MG/DL (ref 60–115)
GLUCOSE BLD-MCNC: 163 MG/DL (ref 70–99)
GLUCOSE BLD-MCNC: 208 MG/DL (ref 60–115)
GLUCOSE BLD-MCNC: 372 MG/DL (ref 60–115)
PERFORMED ON: ABNORMAL
PLATELET # BLD: 206 K/UL (ref 130–400)
POTASSIUM SERPL-SCNC: 4.6 MEQ/L (ref 3.4–4.9)
SODIUM BLD-SCNC: 140 MEQ/L (ref 135–144)

## 2019-04-28 PROCEDURE — 2700000000 HC OXYGEN THERAPY PER DAY

## 2019-04-28 PROCEDURE — 93005 ELECTROCARDIOGRAM TRACING: CPT

## 2019-04-28 PROCEDURE — 6370000000 HC RX 637 (ALT 250 FOR IP): Performed by: INTERNAL MEDICINE

## 2019-04-28 PROCEDURE — 85049 AUTOMATED PLATELET COUNT: CPT

## 2019-04-28 PROCEDURE — 80048 BASIC METABOLIC PNL TOTAL CA: CPT

## 2019-04-28 PROCEDURE — 36415 COLL VENOUS BLD VENIPUNCTURE: CPT

## 2019-04-28 RX ORDER — ISOSORBIDE MONONITRATE 60 MG/1
60 TABLET, EXTENDED RELEASE ORAL DAILY
Status: DISCONTINUED | OUTPATIENT
Start: 2019-04-29 | End: 2019-04-28 | Stop reason: HOSPADM

## 2019-04-28 RX ADMIN — CLOPIDOGREL BISULFATE 75 MG: 75 TABLET, FILM COATED ORAL at 08:52

## 2019-04-28 RX ADMIN — ISOSORBIDE MONONITRATE 30 MG: 30 TABLET, EXTENDED RELEASE ORAL at 08:52

## 2019-04-28 RX ADMIN — LEVOTHYROXINE SODIUM 200 MCG: 0.2 TABLET ORAL at 06:20

## 2019-04-28 RX ADMIN — AMIODARONE HYDROCHLORIDE 200 MG: 200 TABLET ORAL at 08:52

## 2019-04-28 RX ADMIN — MULTIPLE VITAMINS W/ MINERALS TAB 1 TABLET: TAB at 08:52

## 2019-04-28 RX ADMIN — INSULIN LISPRO 5 UNITS: 100 INJECTION, SOLUTION INTRAVENOUS; SUBCUTANEOUS at 16:23

## 2019-04-28 RX ADMIN — METOPROLOL SUCCINATE 25 MG: 25 TABLET, FILM COATED, EXTENDED RELEASE ORAL at 08:52

## 2019-04-28 RX ADMIN — GLIPIZIDE 10 MG: 5 TABLET ORAL at 16:46

## 2019-04-28 RX ADMIN — INSULIN LISPRO 2 UNITS: 100 INJECTION, SOLUTION INTRAVENOUS; SUBCUTANEOUS at 13:36

## 2019-04-28 RX ADMIN — GLIPIZIDE 10 MG: 5 TABLET ORAL at 06:21

## 2019-04-28 RX ADMIN — PANTOPRAZOLE SODIUM 20 MG: 20 TABLET, DELAYED RELEASE ORAL at 06:21

## 2019-04-28 RX ADMIN — HYDRALAZINE HYDROCHLORIDE 50 MG: 50 TABLET, FILM COATED ORAL at 08:52

## 2019-04-28 RX ADMIN — HYDROXYZINE HYDROCHLORIDE 25 MG: 25 TABLET ORAL at 08:52

## 2019-04-28 RX ADMIN — APIXABAN 5 MG: 5 TABLET, FILM COATED ORAL at 08:52

## 2019-04-28 ASSESSMENT — PAIN SCALES - GENERAL: PAINLEVEL_OUTOF10: 0

## 2019-04-28 NOTE — PROGRESS NOTES
[facility] OF [facility] [place] Note      Patient: Benji Fields  Unit/Bed: P011/U395-16  YOB: 1945  MRN: [id number]  Admit Date:  4/24/2019  HOSPITAL day # 2      Subjective Complaint:    Reports feeling the same as yesterday says he was able to lay flat and sleep for several hours,  Chest discomfort has subsided. No palpitations. Physical Examination:     BP (!) 157/70   Pulse 70   Temp 98.3 °F (36.8 °C) (Oral)   Resp 17   Ht 5' 10\" (1.778 m)   Wt 204 lb 9.4 oz (92.8 kg)   SpO2 95%   BMI 29.36 kg/m²         Intake/Output Summary (Last 24 hours) at 4/28/2019 1306  Last data filed at 4/28/2019 0604  Gross per 24 hour   Intake 360 ml   Output 2000 ml   Net -1640 ml            Weights  Wt Readings from Last 3 Encounters:   04/27/19 204 lb 9.4 oz (92.8 kg)   04/18/19 213 lb (96.6 kg)   04/01/19 213 lb 6.4 oz (96.8 kg)                  Benji Fields examined at bedside . oriented times 3. Focused exam reveals:     Cardiac: Heart regular rate and rhythm     Lungs:  clear to auscultation bilaterally-  markedly diminished breath sounds posterior lung fields especially lower one third up. Distant breath sounds throughout. No audible crepitations or wheezing. Extremities:   Trivial edema. Right groin soft and nontender. Telemetry:      normal sinus          LABS:   CBC:   Recent Labs     04/26/19  0605 04/28/19  0612    206      BMP:    Recent Labs     04/26/19  0605 04/27/19  0618 04/28/19  0612    139 140   K 4.1 4.6 4.6    101 106   CO2 25 24 20   BUN 69* 61* 58*   CREATININE 4.10* 4.36* 4.18*   GLUCOSE 142* 246* 163*              Troponin: No results for input(s): TROPONINT in the last 72 hours. BNP:   No results for input(s): PROBNP in the last 72 hours. INR:   No results for input(s): INR in the last 72 hours. Mg: No results for input(s): MG in the last 72 hours.     Cardiac Testing:   Atrial fibrillaiton with rapid rate during study   Moderately dilated left atrium.   Normal left ventricle structure and function.   Left ventricular ejection fraction is visually estimated at 60%.  Mild LVH   Normal right atrium.   Normal right ventricle structure and function.   Normal right ventricle systolic pressure.   Normal valvular structure and function.   No significant regurgitant or stenotic valvular lesions. EKG shows sinus rhythm in the 60s, lateral ST-T abnormality normal intervals  Chest x-ray shows right pleural effusion and mild pulmonary vascular congestion unchanged from admission. Assessment:    Based  on cardiac catheterization findings, patient has large territory of ischemia involving the anterior wall, the inferior wall is collateral dependent, the circumflex territory has disease as well. LVEDP is normal after diuresis of close to 5 L. Atrial fibrillation clearly precipitated the heart failure in the setting of elevated LVEDP to begin with, and chronic diastolic heart failure related to hypertensive renal disease and coronary artery disease. Blood pressure suboptimally controlled at this time. 1.   Class IV left ventricular heart failure, acute on chronic with preserved LV systolic function, congestive h symptoms of one week duration. 2. Angina pectoris, probable non-ST segment elevation myocardial infarction. Mechanism unclear, could be either related to rapid rate and supply demand mismatch or related to plaque rupture. 3. Chronic kidney disease stage IV  4. Paroxysmal atrial fibrillation with rapid ventricular rate on the in sinus rhythm on high risk medication amiodarone. 5. Extensive vascular disease to include cerebrovascular and peripheral vascular disease, high pretest probability of coronary atherosclerosis as well. 6. Retrosternal burning sensation- his anginal equivalent. 7. Renal function complicates management issues and testing. 8. High risk medication-Heparin and amiodarone   9.  Chads 2 Vasc  score of at least 4

## 2019-04-28 NOTE — DISCHARGE SUMMARY
Physician Discharge Summary     Patient ID:  Priscilla Petit  [id number]  71 y.o.  1945    Admit date: 4/24/2019    Discharge date and time: No discharge date for patient encounter. Admitting Physician: Arpit Young MD     Discharge Physician: Dr. Cali Brenner    Admission Diagnoses: Shortness of breath [R06.02]    Discharge Diagnoses: mike, CAD, CKD stage 4, atrial fibrillation, acute diastolic heart failure. Admission Condition: poor    Discharged Condition: fair    Indication for Admission: pt with chest pain, afib, elevated troponin, fluid overload and MIKE    Hospital Course: pt was seen by cardiology. New afib. Fluid overloaded. Was diuresed and rate controlled. Underwent heart cath on 4/26. lvedp on cath was ok (after diuresis). Lasix was held. Had sig lesion in LAD. Considered to be high risk stenting which we cannot do here. Dr. Gurinder Harrell called Dr. Sonia Bain who accepted the patient for transfer to [address] [place] [address] [place]: cardiology    Significant Diagnostic Studies: cbc, renal profile, troponin. EKG with rapid afib. Echo with EF 60%,  Cath with normal LVEDP and LAD lesion. Outstanding Order Results     Date and Time Order Name Status Description    4/28/2019 0553 EKG 12 Lead Preliminary     4/27/2019 0149 EKG 12 Lead Preliminary     4/24/2019 0948 Culture Blood #1 Preliminary     4/24/2019 0945 Culture Blood #2 Preliminary           Treatments: diuresis, rate control, anticoagulation.      Discharge Exam:  BP (!) 157/70   Pulse 70   Temp 98.3 °F (36.8 °C) (Oral)   Resp 17   Ht 5' 10\" (1.778 m)   Wt 204 lb 9.4 oz (92.8 kg)   SpO2 95%   BMI 29.36 kg/m²   General appearance: alert, appears stated age and cooperative  Lungs: clear to auscultation bilaterally  Heart: regular rate and rhythm, S1, S2 normal, no murmur, click, rub or gallop  Abdomen: soft, non-tender; bowel sounds normal; no masses,  no organomegaly  Extremities: extremities normal, atraumatic, no cyanosis or edema    Disposition: to [facility]    Patient Instructions:   Meds sig for plavix 75 daily, amiodarone 200 daily, lipitor 40 daily, glucotrol 10 bid, hydralazine 50 bid, atarax, insulin, synthroid 200, toprol 25, protonix 20, MVI. Diuretics on hold  Activity: activity as tolerated  Diet: cardiac diet  Wound Care: none needed    Follow-up with Dr. Nadia Billingsley after discharge from [facility]  .     SignedAsuncion Mike  4/28/2019  4:54 PM

## 2019-04-29 LAB
BLOOD CULTURE, ROUTINE: NORMAL
CULTURE, BLOOD 2: NORMAL
PERFORMED ON: ABNORMAL
POC ACTIVATED CLOTTING TIME KAOLIN: 153 SEC (ref 82–152)
POC SAMPLE TYPE: ABNORMAL

## 2020-11-03 PROBLEM — I73.9 PERIPHERAL VASCULAR DISEASE (HCC): Status: RESOLVED | Noted: 2018-07-03 | Resolved: 2020-11-03
